# Patient Record
Sex: FEMALE | Race: WHITE | NOT HISPANIC OR LATINO | Employment: OTHER | URBAN - METROPOLITAN AREA
[De-identification: names, ages, dates, MRNs, and addresses within clinical notes are randomized per-mention and may not be internally consistent; named-entity substitution may affect disease eponyms.]

---

## 2023-05-19 ENCOUNTER — TELEPHONE (OUTPATIENT)
Dept: PRIMARY CARE | Facility: CLINIC | Age: 88
End: 2023-05-19
Payer: MEDICARE

## 2023-05-19 NOTE — TELEPHONE ENCOUNTER
Patient would like to get her lab work prior to her medicare wellness appointment in June. Please advise

## 2023-05-25 DIAGNOSIS — E55.9 VITAMIN D DEFICIENCY: ICD-10-CM

## 2023-05-25 DIAGNOSIS — E11.9 CONTROLLED TYPE 2 DIABETES MELLITUS WITHOUT COMPLICATION, WITHOUT LONG-TERM CURRENT USE OF INSULIN (MULTI): ICD-10-CM

## 2023-05-25 DIAGNOSIS — M81.0 OSTEOPOROSIS, UNSPECIFIED OSTEOPOROSIS TYPE, UNSPECIFIED PATHOLOGICAL FRACTURE PRESENCE: ICD-10-CM

## 2023-05-25 DIAGNOSIS — I48.92 ATRIAL FLUTTER, UNSPECIFIED TYPE (MULTI): ICD-10-CM

## 2023-05-25 DIAGNOSIS — E78.5 HYPERLIPIDEMIA, UNSPECIFIED HYPERLIPIDEMIA TYPE: ICD-10-CM

## 2023-05-25 DIAGNOSIS — I10 HYPERTENSION, UNSPECIFIED TYPE: ICD-10-CM

## 2023-06-15 ENCOUNTER — LAB (OUTPATIENT)
Dept: LAB | Facility: LAB | Age: 88
End: 2023-06-15
Payer: MEDICARE

## 2023-06-15 DIAGNOSIS — E55.9 VITAMIN D DEFICIENCY: ICD-10-CM

## 2023-06-15 DIAGNOSIS — E78.5 HYPERLIPIDEMIA, UNSPECIFIED HYPERLIPIDEMIA TYPE: ICD-10-CM

## 2023-06-15 DIAGNOSIS — I10 HYPERTENSION, UNSPECIFIED TYPE: ICD-10-CM

## 2023-06-15 DIAGNOSIS — E87.5 HYPERKALEMIA: ICD-10-CM

## 2023-06-15 DIAGNOSIS — E11.9 CONTROLLED TYPE 2 DIABETES MELLITUS WITHOUT COMPLICATION, WITHOUT LONG-TERM CURRENT USE OF INSULIN (MULTI): ICD-10-CM

## 2023-06-15 DIAGNOSIS — M81.0 OSTEOPOROSIS, UNSPECIFIED OSTEOPOROSIS TYPE, UNSPECIFIED PATHOLOGICAL FRACTURE PRESENCE: ICD-10-CM

## 2023-06-15 PROCEDURE — 84443 ASSAY THYROID STIM HORMONE: CPT

## 2023-06-15 PROCEDURE — 36415 COLL VENOUS BLD VENIPUNCTURE: CPT

## 2023-06-15 PROCEDURE — 82306 VITAMIN D 25 HYDROXY: CPT

## 2023-06-15 PROCEDURE — 83036 HEMOGLOBIN GLYCOSYLATED A1C: CPT

## 2023-06-15 PROCEDURE — 85025 COMPLETE CBC W/AUTO DIFF WBC: CPT

## 2023-06-15 PROCEDURE — 80061 LIPID PANEL: CPT

## 2023-06-15 PROCEDURE — 80053 COMPREHEN METABOLIC PANEL: CPT

## 2023-06-16 ENCOUNTER — APPOINTMENT (OUTPATIENT)
Dept: PRIMARY CARE | Facility: CLINIC | Age: 88
End: 2023-06-16
Payer: MEDICARE

## 2023-06-16 LAB
ALANINE AMINOTRANSFERASE (SGPT) (U/L) IN SER/PLAS: 9 U/L (ref 7–45)
ALBUMIN (G/DL) IN SER/PLAS: 4.2 G/DL (ref 3.4–5)
ALKALINE PHOSPHATASE (U/L) IN SER/PLAS: 47 U/L (ref 33–136)
ANION GAP IN SER/PLAS: 14 MMOL/L (ref 10–20)
ASPARTATE AMINOTRANSFERASE (SGOT) (U/L) IN SER/PLAS: 12 U/L (ref 9–39)
BASOPHILS (10*3/UL) IN BLOOD BY AUTOMATED COUNT: 0.05 X10E9/L (ref 0–0.1)
BASOPHILS/100 LEUKOCYTES IN BLOOD BY AUTOMATED COUNT: 0.5 % (ref 0–2)
BILIRUBIN TOTAL (MG/DL) IN SER/PLAS: 0.6 MG/DL (ref 0–1.2)
CALCIDIOL (25 OH VITAMIN D3) (NG/ML) IN SER/PLAS: 41 NG/ML
CALCIUM (MG/DL) IN SER/PLAS: 10.1 MG/DL (ref 8.6–10.6)
CARBON DIOXIDE, TOTAL (MMOL/L) IN SER/PLAS: 26 MMOL/L (ref 21–32)
CHLORIDE (MMOL/L) IN SER/PLAS: 102 MMOL/L (ref 98–107)
CHOLESTEROL (MG/DL) IN SER/PLAS: 120 MG/DL (ref 0–199)
CHOLESTEROL IN HDL (MG/DL) IN SER/PLAS: 43.4 MG/DL
CHOLESTEROL/HDL RATIO: 2.8
CREATININE (MG/DL) IN SER/PLAS: 1.05 MG/DL (ref 0.5–1.05)
EOSINOPHILS (10*3/UL) IN BLOOD BY AUTOMATED COUNT: 0.24 X10E9/L (ref 0–0.4)
EOSINOPHILS/100 LEUKOCYTES IN BLOOD BY AUTOMATED COUNT: 2.5 % (ref 0–6)
ERYTHROCYTE DISTRIBUTION WIDTH (RATIO) BY AUTOMATED COUNT: 15.8 % (ref 11.5–14.5)
ERYTHROCYTE MEAN CORPUSCULAR HEMOGLOBIN CONCENTRATION (G/DL) BY AUTOMATED: 29.8 G/DL (ref 32–36)
ERYTHROCYTE MEAN CORPUSCULAR VOLUME (FL) BY AUTOMATED COUNT: 96 FL (ref 80–100)
ERYTHROCYTES (10*6/UL) IN BLOOD BY AUTOMATED COUNT: 4.26 X10E12/L (ref 4–5.2)
ESTIMATED AVERAGE GLUCOSE FOR HBA1C: 137 MG/DL
GFR FEMALE: 52 ML/MIN/1.73M2
GLUCOSE (MG/DL) IN SER/PLAS: 112 MG/DL (ref 74–99)
HEMATOCRIT (%) IN BLOOD BY AUTOMATED COUNT: 40.9 % (ref 36–46)
HEMOGLOBIN (G/DL) IN BLOOD: 12.2 G/DL (ref 12–16)
HEMOGLOBIN A1C/HEMOGLOBIN TOTAL IN BLOOD: 6.4 %
IMMATURE GRANULOCYTES/100 LEUKOCYTES IN BLOOD BY AUTOMATED COUNT: 0.6 % (ref 0–0.9)
LDL: 47 MG/DL (ref 0–99)
LEUKOCYTES (10*3/UL) IN BLOOD BY AUTOMATED COUNT: 9.8 X10E9/L (ref 4.4–11.3)
LYMPHOCYTES (10*3/UL) IN BLOOD BY AUTOMATED COUNT: 1.69 X10E9/L (ref 0.8–3)
LYMPHOCYTES/100 LEUKOCYTES IN BLOOD BY AUTOMATED COUNT: 17.3 % (ref 13–44)
MONOCYTES (10*3/UL) IN BLOOD BY AUTOMATED COUNT: 0.65 X10E9/L (ref 0.05–0.8)
MONOCYTES/100 LEUKOCYTES IN BLOOD BY AUTOMATED COUNT: 6.7 % (ref 2–10)
NEUTROPHILS (10*3/UL) IN BLOOD BY AUTOMATED COUNT: 7.07 X10E9/L (ref 1.6–5.5)
NEUTROPHILS/100 LEUKOCYTES IN BLOOD BY AUTOMATED COUNT: 72.4 % (ref 40–80)
NRBC (PER 100 WBCS) BY AUTOMATED COUNT: 0 /100 WBC (ref 0–0)
PLATELETS (10*3/UL) IN BLOOD AUTOMATED COUNT: 271 X10E9/L (ref 150–450)
POTASSIUM (MMOL/L) IN SER/PLAS: 5.8 MMOL/L (ref 3.5–5.3)
PROTEIN TOTAL: 7 G/DL (ref 6.4–8.2)
SODIUM (MMOL/L) IN SER/PLAS: 136 MMOL/L (ref 136–145)
THYROTROPIN (MIU/L) IN SER/PLAS BY DETECTION LIMIT <= 0.05 MIU/L: 1.05 MIU/L (ref 0.44–3.98)
TRIGLYCERIDE (MG/DL) IN SER/PLAS: 147 MG/DL (ref 0–149)
UREA NITROGEN (MG/DL) IN SER/PLAS: 27 MG/DL (ref 6–23)
VLDL: 29 MG/DL (ref 0–40)

## 2023-06-17 ENCOUNTER — LAB (OUTPATIENT)
Dept: LAB | Facility: LAB | Age: 88
End: 2023-06-17
Payer: MEDICARE

## 2023-06-17 DIAGNOSIS — E87.5 HYPERKALEMIA: ICD-10-CM

## 2023-06-17 LAB
ALANINE AMINOTRANSFERASE (SGPT) (U/L) IN SER/PLAS: 7 U/L (ref 7–45)
ALBUMIN (G/DL) IN SER/PLAS: 4.4 G/DL (ref 3.4–5)
ALKALINE PHOSPHATASE (U/L) IN SER/PLAS: 47 U/L (ref 33–136)
ANION GAP IN SER/PLAS: 16 MMOL/L (ref 10–20)
ASPARTATE AMINOTRANSFERASE (SGOT) (U/L) IN SER/PLAS: 11 U/L (ref 9–39)
BILIRUBIN TOTAL (MG/DL) IN SER/PLAS: 0.5 MG/DL (ref 0–1.2)
CALCIUM (MG/DL) IN SER/PLAS: 10.3 MG/DL (ref 8.6–10.6)
CARBON DIOXIDE, TOTAL (MMOL/L) IN SER/PLAS: 26 MMOL/L (ref 21–32)
CHLORIDE (MMOL/L) IN SER/PLAS: 104 MMOL/L (ref 98–107)
CREATININE (MG/DL) IN SER/PLAS: 1.1 MG/DL (ref 0.5–1.05)
GFR FEMALE: 49 ML/MIN/1.73M2
GLUCOSE (MG/DL) IN SER/PLAS: 110 MG/DL (ref 74–99)
POTASSIUM (MMOL/L) IN SER/PLAS: 5.7 MMOL/L (ref 3.5–5.3)
PROTEIN TOTAL: 6.9 G/DL (ref 6.4–8.2)
SODIUM (MMOL/L) IN SER/PLAS: 140 MMOL/L (ref 136–145)
UREA NITROGEN (MG/DL) IN SER/PLAS: 25 MG/DL (ref 6–23)

## 2023-06-17 PROCEDURE — 36415 COLL VENOUS BLD VENIPUNCTURE: CPT

## 2023-06-17 PROCEDURE — 80053 COMPREHEN METABOLIC PANEL: CPT

## 2023-06-19 ENCOUNTER — OFFICE VISIT (OUTPATIENT)
Dept: PRIMARY CARE | Facility: CLINIC | Age: 88
End: 2023-06-19
Payer: MEDICARE

## 2023-06-19 VITALS
HEART RATE: 74 BPM | DIASTOLIC BLOOD PRESSURE: 62 MMHG | BODY MASS INDEX: 43.45 KG/M2 | SYSTOLIC BLOOD PRESSURE: 124 MMHG | WEIGHT: 207 LBS | HEIGHT: 58 IN

## 2023-06-19 DIAGNOSIS — Z78.0 ASYMPTOMATIC MENOPAUSAL STATE: ICD-10-CM

## 2023-06-19 DIAGNOSIS — Z00.00 ROUTINE GENERAL MEDICAL EXAMINATION AT HEALTH CARE FACILITY: Primary | ICD-10-CM

## 2023-06-19 DIAGNOSIS — Z13.89 ENCOUNTER FOR SCREENING FOR OTHER DISORDER: ICD-10-CM

## 2023-06-19 DIAGNOSIS — Z12.31 ENCOUNTER FOR SCREENING MAMMOGRAM FOR BREAST CANCER: ICD-10-CM

## 2023-06-19 DIAGNOSIS — E87.5 HYPERKALEMIA: ICD-10-CM

## 2023-06-19 DIAGNOSIS — I48.92 ATRIAL FLUTTER, UNSPECIFIED TYPE (MULTI): ICD-10-CM

## 2023-06-19 PROBLEM — R53.1 WEAKNESS: Status: ACTIVE | Noted: 2023-06-19

## 2023-06-19 PROBLEM — M54.50 LOW BACK PAIN: Status: ACTIVE | Noted: 2023-06-19

## 2023-06-19 PROBLEM — I50.30 DIASTOLIC HEART FAILURE (MULTI): Status: ACTIVE | Noted: 2023-06-19

## 2023-06-19 PROBLEM — M10.9 GOUT: Status: ACTIVE | Noted: 2023-06-19

## 2023-06-19 PROBLEM — R60.0 EDEMA OF BOTH LOWER EXTREMITIES: Status: ACTIVE | Noted: 2023-06-19

## 2023-06-19 PROBLEM — E11.40 DIABETIC NEUROPATHY (MULTI): Status: ACTIVE | Noted: 2023-06-19

## 2023-06-19 PROBLEM — M17.12 OSTEOARTHRITIS OF LEFT KNEE: Status: ACTIVE | Noted: 2023-06-19

## 2023-06-19 PROBLEM — I51.7 ENLARGED HEART: Status: ACTIVE | Noted: 2023-06-19

## 2023-06-19 PROBLEM — R80.9 PROTEINURIA: Status: ACTIVE | Noted: 2023-06-19

## 2023-06-19 PROBLEM — I12.9 HYPERTENSIVE KIDNEY DISEASE WITH CKD STAGE III (MULTI): Status: ACTIVE | Noted: 2020-08-21

## 2023-06-19 PROBLEM — R00.2 PALPITATIONS: Status: ACTIVE | Noted: 2023-06-19

## 2023-06-19 PROBLEM — N18.30 HYPERTENSIVE KIDNEY DISEASE WITH CKD STAGE III (MULTI): Status: ACTIVE | Noted: 2020-08-21

## 2023-06-19 PROBLEM — R26.89 BALANCE PROBLEM: Status: ACTIVE | Noted: 2023-06-19

## 2023-06-19 PROCEDURE — 3074F SYST BP LT 130 MM HG: CPT | Performed by: FAMILY MEDICINE

## 2023-06-19 PROCEDURE — 1160F RVW MEDS BY RX/DR IN RCRD: CPT | Performed by: FAMILY MEDICINE

## 2023-06-19 PROCEDURE — 99214 OFFICE O/P EST MOD 30 MIN: CPT | Performed by: FAMILY MEDICINE

## 2023-06-19 PROCEDURE — 3078F DIAST BP <80 MM HG: CPT | Performed by: FAMILY MEDICINE

## 2023-06-19 PROCEDURE — 1159F MED LIST DOCD IN RCRD: CPT | Performed by: FAMILY MEDICINE

## 2023-06-19 PROCEDURE — G0439 PPPS, SUBSEQ VISIT: HCPCS | Performed by: FAMILY MEDICINE

## 2023-06-19 PROCEDURE — 1036F TOBACCO NON-USER: CPT | Performed by: FAMILY MEDICINE

## 2023-06-19 PROCEDURE — 93000 ELECTROCARDIOGRAM COMPLETE: CPT | Performed by: FAMILY MEDICINE

## 2023-06-19 PROCEDURE — 1170F FXNL STATUS ASSESSED: CPT | Performed by: FAMILY MEDICINE

## 2023-06-19 PROCEDURE — G0444 DEPRESSION SCREEN ANNUAL: HCPCS | Performed by: FAMILY MEDICINE

## 2023-06-19 PROCEDURE — 99397 PER PM REEVAL EST PAT 65+ YR: CPT | Performed by: FAMILY MEDICINE

## 2023-06-19 RX ORDER — FUROSEMIDE 20 MG/1
1 TABLET ORAL
COMMUNITY
Start: 2022-11-21 | End: 2023-10-02 | Stop reason: SDUPTHER

## 2023-06-19 RX ORDER — ALLOPURINOL 300 MG/1
1 TABLET ORAL DAILY
COMMUNITY
Start: 2013-10-07 | End: 2024-02-05

## 2023-06-19 RX ORDER — ACETAMINOPHEN 325 MG/1
500 TABLET ORAL DAILY
COMMUNITY

## 2023-06-19 RX ORDER — APIXABAN 2.5 MG/1
1 TABLET, FILM COATED ORAL 2 TIMES DAILY
COMMUNITY
Start: 2015-12-02 | End: 2024-02-05

## 2023-06-19 RX ORDER — METFORMIN HYDROCHLORIDE 500 MG/1
2 TABLET ORAL 2 TIMES DAILY
COMMUNITY
Start: 2013-02-06 | End: 2023-10-27

## 2023-06-19 RX ORDER — LANOLIN ALCOHOL/MO/W.PET/CERES
1000 CREAM (GRAM) TOPICAL EVERY OTHER DAY
COMMUNITY

## 2023-06-19 RX ORDER — SIMVASTATIN 10 MG/1
1 TABLET, FILM COATED ORAL NIGHTLY
COMMUNITY
Start: 2013-10-13 | End: 2024-02-05

## 2023-06-19 RX ORDER — LISINOPRIL 5 MG/1
5 TABLET ORAL
COMMUNITY
Start: 2013-10-13 | End: 2023-07-07 | Stop reason: SINTOL

## 2023-06-19 RX ORDER — METOPROLOL TARTRATE 25 MG/1
25 TABLET, FILM COATED ORAL 2 TIMES DAILY
COMMUNITY
Start: 2020-12-14 | End: 2023-10-27

## 2023-06-19 RX ORDER — DILTIAZEM HYDROCHLORIDE 180 MG/1
180 CAPSULE, EXTENDED RELEASE ORAL 2 TIMES DAILY
COMMUNITY
Start: 2016-02-01 | End: 2023-11-29

## 2023-06-19 ASSESSMENT — ENCOUNTER SYMPTOMS
LIGHT-HEADEDNESS: 0
OCCASIONAL FEELINGS OF UNSTEADINESS: 1
HEADACHES: 0
DIZZINESS: 0
FEVER: 0
PALPITATIONS: 0
ACTIVITY CHANGE: 0
FACIAL ASYMMETRY: 0
DEPRESSION: 0
ARTHRALGIAS: 0
FATIGUE: 0
BACK PAIN: 0
COUGH: 0
SHORTNESS OF BREATH: 1
COLOR CHANGE: 0
CHEST TIGHTNESS: 0
LOSS OF SENSATION IN FEET: 1
CHOKING: 0
APPETITE CHANGE: 0

## 2023-06-19 ASSESSMENT — PATIENT HEALTH QUESTIONNAIRE - PHQ9
1. LITTLE INTEREST OR PLEASURE IN DOING THINGS: NOT AT ALL
SUM OF ALL RESPONSES TO PHQ9 QUESTIONS 1 AND 2: 0
2. FEELING DOWN, DEPRESSED OR HOPELESS: NOT AT ALL

## 2023-06-19 ASSESSMENT — ACTIVITIES OF DAILY LIVING (ADL)
DRESSING: INDEPENDENT
TAKING_MEDICATION: INDEPENDENT
DOING_HOUSEWORK: NEEDS ASSISTANCE
MANAGING_FINANCES: INDEPENDENT
GROCERY_SHOPPING: NEEDS ASSISTANCE
BATHING: INDEPENDENT

## 2023-06-19 NOTE — PROGRESS NOTES
Subjective   Reason for Visit: Jessie Bravo is an 88 y.o. female here for a Medicare Wellness visit.               HPI  Patient presents for physical exam no pap.      Fam Hx  Mom (86) , CAD, CHF, DMII  Dad (41)  new years day, PE vs ACS?     OB/GYN [facility]  Cardiologist, Dr. Morelos     Exercise walks  ETOH denies  Caffeine 1 cup coffee/day, hot tea once a week  Tobacco quit at 31, 15 years >1ppd     she has a place in [place], [facility]     Mammogram due   DEXA due  osteopenia  Colonoscopy refuses at this time     Patient denies other complaints.                       Patient Care Team:  Laura Zhang DO as PCP - General     Review of Systems   Constitutional:  Negative for activity change, appetite change, fatigue and fever.   HENT:  Negative for congestion.    Respiratory:  Positive for shortness of breath. Negative for cough, choking and chest tightness.    Cardiovascular:  Positive for leg swelling. Negative for chest pain and palpitations.   Musculoskeletal:  Negative for arthralgias, back pain and gait problem.   Skin:  Negative for color change and pallor.   Neurological:  Negative for dizziness, facial asymmetry, light-headedness and headaches.       Objective   Vitals:  There were no vitals taken for this visit.      Physical Exam  Constitutional:       General: She is not in acute distress.     Appearance: Normal appearance. She is not toxic-appearing.   HENT:      Head: Normocephalic.      Right Ear: Tympanic membrane, ear canal and external ear normal.      Left Ear: Tympanic membrane, ear canal and external ear normal.      Nose: Nose normal.      Mouth/Throat:      Pharynx: Oropharynx is clear.   Eyes:      Conjunctiva/sclera: Conjunctivae normal.      Pupils: Pupils are equal, round, and reactive to light.   Cardiovascular:      Rate and Rhythm: Normal rate. Rhythm irregular.      Pulses: Normal pulses.      Heart sounds: Murmur heard.   Pulmonary:      Effort: No  respiratory distress.      Breath sounds: No wheezing, rhonchi or rales.   Abdominal:      General: Bowel sounds are normal. There is no distension.      Palpations: Abdomen is soft.      Tenderness: There is no abdominal tenderness.   Musculoskeletal:         General: No swelling or tenderness.      Cervical back: No tenderness.      Right lower leg: Edema present.      Left lower leg: Edema present.      Comments: +1 pitting edema bilateral lower extremities   Skin:     Findings: No lesion or rash.   Neurological:      General: No focal deficit present.      Mental Status: She is alert and oriented to person, place, and time. Mental status is at baseline.      Gait: Gait normal.   Psychiatric:         Mood and Affect: Mood normal.         Behavior: Behavior normal.         Thought Content: Thought content normal.         Judgment: Judgment normal.         Assessment/Plan   Problem List Items Addressed This Visit    None  1. Patient's blood work discussed at this office visit  2. Patient's LDL goal <70, current LDL 47  3. Patient's TG goal <150, current , start on TYPE IV diet  4. HgbA1c goal <6.5, current 6.4 continue diet and exercise  5. Vitamin d level is back to normal continue on vitamin D replacement daily  6. Patient's potassium level is still elevated, we will discuss at this office visit  7. Mammogram due 2023  8. DEXA due 2023 osteopenia  9. Colonoscopy refuses at this time, IFOB  10. Patient to call if questions or concerns

## 2023-06-22 ENCOUNTER — LAB (OUTPATIENT)
Dept: LAB | Facility: LAB | Age: 88
End: 2023-06-22
Payer: MEDICARE

## 2023-06-22 DIAGNOSIS — E87.5 HYPERKALEMIA: ICD-10-CM

## 2023-06-22 PROCEDURE — 80053 COMPREHEN METABOLIC PANEL: CPT

## 2023-06-22 PROCEDURE — 36415 COLL VENOUS BLD VENIPUNCTURE: CPT

## 2023-06-23 LAB
ALANINE AMINOTRANSFERASE (SGPT) (U/L) IN SER/PLAS: 7 U/L (ref 7–45)
ALBUMIN (G/DL) IN SER/PLAS: 4.5 G/DL (ref 3.4–5)
ALKALINE PHOSPHATASE (U/L) IN SER/PLAS: 54 U/L (ref 33–136)
ANION GAP IN SER/PLAS: 18 MMOL/L (ref 10–20)
ASPARTATE AMINOTRANSFERASE (SGOT) (U/L) IN SER/PLAS: 12 U/L (ref 9–39)
BILIRUBIN TOTAL (MG/DL) IN SER/PLAS: 0.4 MG/DL (ref 0–1.2)
CALCIUM (MG/DL) IN SER/PLAS: 9.8 MG/DL (ref 8.6–10.6)
CARBON DIOXIDE, TOTAL (MMOL/L) IN SER/PLAS: 21 MMOL/L (ref 21–32)
CHLORIDE (MMOL/L) IN SER/PLAS: 102 MMOL/L (ref 98–107)
CREATININE (MG/DL) IN SER/PLAS: 1.06 MG/DL (ref 0.5–1.05)
GFR FEMALE: 51 ML/MIN/1.73M2
GLUCOSE (MG/DL) IN SER/PLAS: 109 MG/DL (ref 74–99)
POTASSIUM (MMOL/L) IN SER/PLAS: 5.6 MMOL/L (ref 3.5–5.3)
PROTEIN TOTAL: 7.2 G/DL (ref 6.4–8.2)
SODIUM (MMOL/L) IN SER/PLAS: 135 MMOL/L (ref 136–145)
UREA NITROGEN (MG/DL) IN SER/PLAS: 25 MG/DL (ref 6–23)

## 2023-06-28 ENCOUNTER — TELEPHONE (OUTPATIENT)
Dept: PRIMARY CARE | Facility: CLINIC | Age: 88
End: 2023-06-28
Payer: MEDICARE

## 2023-06-28 NOTE — TELEPHONE ENCOUNTER
Marcelina with [facility] left a voicemail stating patient was discharged yesterday and was seeing if you would follow for home care services, skilled nursing, PT, OT.     [phone]

## 2023-06-29 ENCOUNTER — PATIENT OUTREACH (OUTPATIENT)
Dept: CARE COORDINATION | Facility: CLINIC | Age: 88
End: 2023-06-29
Payer: MEDICARE

## 2023-06-29 DIAGNOSIS — E87.5 HYPERKALEMIA: ICD-10-CM

## 2023-06-29 RX ORDER — CEFDINIR 300 MG/1
300 CAPSULE ORAL 2 TIMES DAILY
Qty: 4 CAPSULE | Refills: 0 | COMMUNITY
Start: 2023-06-27 | End: 2023-06-29

## 2023-06-29 NOTE — PROGRESS NOTES
Discharge Facility:[facility]  Discharge Diagnosis:E87.5 Hyperkalemia  Admission Date:6/24/23  Discharge Date: 6/27/23    PCP Appointment Date:7/7/23  Specialist Appointment Date:   Hospital Encounter and Summary: Linked   See discharge assessment below for further details    Engagement  Call Start Time: 1000 (6/29/2023 10:00 AM)    Medications  Medications reviewed with patient/caregiver?: Yes (6/29/2023 10:00 AM)  Is the patient having any side effects they believe may be caused by any medication additions or changes?: No (6/29/2023 10:00 AM)  Does the patient have all medications ordered at discharge?: Yes (6/29/2023 10:00 AM)  Care Management Interventions: Provided patient education (6/29/2023 10:00 AM)  Is the patient taking all medications as directed (includes completed medication regime)?: Yes (6/29/2023 10:00 AM)  Care Management Interventions: Provided patient education (6/29/2023 10:00 AM)  Medication Comments: see med list (6/29/2023 10:00 AM)    Appointments  Does the patient have a primary care provider?: Yes (6/29/2023 10:00 AM)  Care Management Interventions: Verified appointment date/time/provider (6/29/2023 10:00 AM)  Has the patient kept scheduled appointments due by today?: Yes (6/29/2023 10:00 AM)  Care Management Interventions: Advised patient to keep appointment; Educated on importance of keeping appointment (6/29/2023 10:00 AM)    Self Management  What is the home health agency?: unsure of name (6/29/2023 10:00 AM)  Has home health visited the patient within 72 hours of discharge?: Call prior to 72 hours (6/29/2023 10:00 AM)    Patient Teaching  Does the patient have access to their discharge instructions?: Yes (6/29/2023 10:00 AM)  Care Management Interventions: Reviewed instructions with patient (6/29/2023 10:00 AM)  What is the patient's perception of their health status since discharge?: Improving (6/29/2023 10:00 AM)  Is the patient/caregiver able to teach back the hierarchy of who to  call/visit for symptoms/problems? PCP, Specialist, Home Health nurse, Urgent Care, ED, 911: Yes (6/29/2023 10:00 AM)

## 2023-07-05 ENCOUNTER — TELEPHONE (OUTPATIENT)
Dept: PRIMARY CARE | Facility: CLINIC | Age: 88
End: 2023-07-05
Payer: MEDICARE

## 2023-07-05 NOTE — TELEPHONE ENCOUNTER
[facility] left a voicemail wanted to give you an update, they have reached out to the patient for PT and OT. Did leave a voicemail for the patient.

## 2023-07-07 ENCOUNTER — OFFICE VISIT (OUTPATIENT)
Dept: PRIMARY CARE | Facility: CLINIC | Age: 88
End: 2023-07-07
Payer: MEDICARE

## 2023-07-07 VITALS
HEART RATE: 54 BPM | DIASTOLIC BLOOD PRESSURE: 70 MMHG | OXYGEN SATURATION: 93 % | RESPIRATION RATE: 16 BRPM | WEIGHT: 199.4 LBS | TEMPERATURE: 97.1 F | SYSTOLIC BLOOD PRESSURE: 124 MMHG | HEIGHT: 58 IN | BODY MASS INDEX: 41.86 KG/M2

## 2023-07-07 DIAGNOSIS — R09.02 HYPOXIA: ICD-10-CM

## 2023-07-07 DIAGNOSIS — R31.9 URINARY TRACT INFECTION WITH HEMATURIA, SITE UNSPECIFIED: ICD-10-CM

## 2023-07-07 DIAGNOSIS — N39.0 URINARY TRACT INFECTION WITH HEMATURIA, SITE UNSPECIFIED: ICD-10-CM

## 2023-07-07 DIAGNOSIS — E87.5 HYPERKALEMIA: Primary | ICD-10-CM

## 2023-07-07 PROCEDURE — 3078F DIAST BP <80 MM HG: CPT | Performed by: FAMILY MEDICINE

## 2023-07-07 PROCEDURE — 99495 TRANSJ CARE MGMT MOD F2F 14D: CPT | Performed by: FAMILY MEDICINE

## 2023-07-07 PROCEDURE — 1036F TOBACCO NON-USER: CPT | Performed by: FAMILY MEDICINE

## 2023-07-07 PROCEDURE — 1159F MED LIST DOCD IN RCRD: CPT | Performed by: FAMILY MEDICINE

## 2023-07-07 PROCEDURE — 1160F RVW MEDS BY RX/DR IN RCRD: CPT | Performed by: FAMILY MEDICINE

## 2023-07-07 PROCEDURE — 3074F SYST BP LT 130 MM HG: CPT | Performed by: FAMILY MEDICINE

## 2023-07-07 ASSESSMENT — ENCOUNTER SYMPTOMS
LOSS OF SENSATION IN FEET: 0
OCCASIONAL FEELINGS OF UNSTEADINESS: 0
DEPRESSION: 0

## 2023-07-07 NOTE — PROGRESS NOTES
"Patient: Jessie Bravo  : 1935  PCP: Laura Zhang DO  MRN: [id number]  Program: No linked episodes     Jessie Bravo is a 88 y.o. female presenting today for follow-up after being discharged from the hospital 10 days ago. The main problem requiring admission was a hyperkalemia, hypoxia, UTI. The discharge summary and/or Transitional Care Management documentation was reviewed. Medication reconciliation was performed as indicated via the \"Chaz as Reviewed\" timestamp.     Jessie Bravo was contacted by Transitional Care Management services two days after her discharge. This encounter and supporting documentation was reviewed.    The complexity of medical decision making for this patient's transitional care is moderate.    DISCHARGE SUMMARY    PATIENT NAME: Jessie Bravo Code Status: Full Code  MRN: [id number]    Highest Readmission Risk Score: 19  The 30 day readmissions risk score is derived from an internally validated risk model which evaluates patient level characteristics, utilization history, medication orders and lab results up until the day of discharge. Patients with a score of 40 or above are considered highest risk for readmission. Specific patient level drivers will be listed at the bottom of the summary.    Admission Information    Admission Information  ADMIT DATE: 2023  DISCHARGE DATE: 2023    MY DOCTORS AND MEDICAL TEAM:  My Main Hospital Doctor: Benjamín Stephens MD  Primary Care Provider: Laura Zhang DO  My Medical Team Members: Treatment Team:  Attending Provider: Benjamín Stephens MD  Primary Service: AK SOUND BLUE  Consulting: Risa Chester MD  Consulting: Yousif Harding MD    MY CONDITION AT DISCHARGE: Stable    REASON I WAS IN THE HOSPITAL: Hyperkalemia , hypoxia , UTI    SUMMARY OF WHAT HAPPENED WHILE I WAS IN THE HOSPITAL:  Patient presented to ED due to hyperkalemia on outpatient labs.  On admission she was found to be hypoxic required 2L NC likely due to her pulmonary " fibrosis and concern of CHF , Echocardiogram completed with normal heart function , seen by Cardiology and no further work up is recommended , Afib remained controlled on BB and patient on liquids .  Patient received lasix on admission , lisinopril held ,  her K back to normal , respiratory status improved and currently stable in room air,  Cr at baseline , her BP has been controlled off lisinopril , follow up with PCP in 1 week to repeat BMP and resume lisinopril if appropriate.  Seen by nephrology .  Noted to have positive UA with mild leukocytosis, started on antibiotics and to completed the course as outpatient.  Patient is medically stable for discharge , home with [facility].    OTHER PROBLEMS/DIAGNOSIS:  Principal Problem:  Hyperkalemia  Active Problems:  Pulmonary fibrosis (HCC)  Essential hypertension  Hypertensive kidney disease with CKD stage III (HCC)  Acute on chronic systolic CHF (congestive heart failure) (HCC)  Dyspnea on exertion  Localized edema  MICHAEL (acute kidney injury) (HCC)  Lower extremity edema  Renal insufficiency  Resolved Problems:  * No resolved hospital problems. *    OPERATIONS PERFORMED WHILE IN THE HOSPITAL: None    IMPORTANT TEST/PROCEDURES:  Echocardiogram    TEST RESULTS NOT AVAILABLE AT THIS TIME:  No pending results    Discharge Disposition  Discharge Disposition: Home With Home Care        Activity When You Leave the Hospital    Resume pre-hospital activity        Diet Instructions    Resume your pre-hospital diet        Follow Up Appointments    Follow-Up Appointment  When: In 1 week  Laura Zhang  [phone]   Physician Services  [address]  [address]  [facility] [address]    PCP Requested Referral        Additional Provider to Provider Information:    Treatment Team:  Attending Provider: Benjamín Stephens MD  Primary Service: AK SOUND BLUE  Consulting: Risa Chester MD  Consulting: Yousif Harding MD  Transitions of Care Critical Issues:  As DC summary    LABS AND PROCEDURES  "PENDING AT DISCHARGE: No pending results.        FOLLOW-UP APPOINTMENTS ALREADY SCHEDULED WITH A [facility] PROVIDER:  No future appointments.    ALLERGIES  No Known Allergies    DISCHARGE MEDICATION: Current Discharge Medication List    START taking these medications    cefdinir (OMNICEF) 300 mg  Take 300 mg by mouth twice daily.    Qty: 4 capsule Refills: 0    CONTINUE these medications which have NOT CHANGED    apixaban (ELIQUIS) 2.5 mg  Take 2.5 mg by mouth twice daily.    Qty: 180 tablet Refills: 3    VITAMIN B-12 1,000 mcg  Take 1,000 mcg by mouth every other day.    allopurinol (ZYLOPRIM) 300 mg  Take 300 mg by mouth once daily.    CARTIA  mg  Take 180 mg by mouth twice daily.    metFORMIN (GLUCOPHAGE) 1,000 mg  Take 1,000 mg by mouth twice daily with meals.    simvastatin (ZOCOR) 10 mg  Take 10 mg by mouth daily at bedtime.    VITAMIN E,DL-ALPHA TOCOPHEROL, (VITAMIN E, BULK, MISC) 1 capsule  Take 1 capsule by mouth two times a week.    furosemide (LASIX) 20 mg tablet  TAKE 1 TABLET BY MOUTH EVERY MONDAY, WEDNESDAY AND FRIDAY  Qty: 90 tablet Refills: 3    metoprolol tartrate (short acting) (LOPRESSOR) 25 mg  Take 25 mg by mouth twice daily.    Qty: 180 tablet Refills: 3    CALCIUM CARBONATE/VITAMIN D3 (VITAMIN D-3 ORAL) 2,000 Units  Take 2,000 Units by mouth once daily.    acetaminophen (TYLENOL) 650 mg  Take 650 mg by mouth every 6 hours as needed.    ASCORBIC ACID (VITAMIN C ORAL) 1 tablet  Take 1 tablet by mouth once daily.        Discharge Physical Exam:  VITAL SIGNS: /66  Pulse 107  Temp 36.4 °C (97.5 °F) (Oral)  Resp 20  Ht 149.9 cm (4' 11\")  Wt 90 kg (198 lb 6.6 oz)  SpO2 92%  BMI 40.07 kg/m²  GENERAL: Alert, no distress, cooperative  LUNGS: Lungs clear to auscultation, Good diaphragmatic excursion  CARDIAC: Normal S1 and S2; no rubs, murmurs, or gallops  ABDOMEN: Abdomen soft, non-tender, BS normal, No masses or organomegaly  EXTREMITIES: Extremities normal, no deformities, " edema, clubbing or skin discoloration. Good capillary refill., No ulcers    Review of Systems    Family History   Problem Relation Name Age of Onset    Diabetes type II Mother      Heart disease Mother      Heart attack Father      Breast cancer Sister      Other (bladder cancer) Sister      Thyroid cancer Sister      Other (MURDERED) Sister         Engagement  Call Start Time: 1000 (6/29/2023 10:00 AM)    Medications  Medications reviewed with patient/caregiver?: Yes (6/29/2023 10:00 AM)  Is the patient having any side effects they believe may be caused by any medication additions or changes?: No (6/29/2023 10:00 AM)  Does the patient have all medications ordered at discharge?: Yes (6/29/2023 10:00 AM)  Care Management Interventions: Provided patient education (6/29/2023 10:00 AM)  Is the patient taking all medications as directed (includes completed medication regime)?: Yes (6/29/2023 10:00 AM)  Care Management Interventions: Provided patient education (6/29/2023 10:00 AM)  Medication Comments: see med list (6/29/2023 10:00 AM)    Appointments  Does the patient have a primary care provider?: Yes (6/29/2023 10:00 AM)  Care Management Interventions: Verified appointment date/time/provider (6/29/2023 10:00 AM)  Has the patient kept scheduled appointments due by today?: Yes (6/29/2023 10:00 AM)  Care Management Interventions: Advised patient to keep appointment; Educated on importance of keeping appointment (6/29/2023 10:00 AM)    Self Management  What is the home health agency?: unsure of name (6/29/2023 10:00 AM)  Has home health visited the patient within 72 hours of discharge?: Call prior to 72 hours (6/29/2023 10:00 AM)    Patient Teaching  Does the patient have access to their discharge instructions?: Yes (6/29/2023 10:00 AM)  Care Management Interventions: Reviewed instructions with patient (6/29/2023 10:00 AM)  What is the patient's perception of their health status since discharge?: Improving (6/29/2023 10:00  AM)  Is the patient/caregiver able to teach back the hierarchy of who to call/visit for symptoms/problems? PCP, Specialist, Home Health nurse, Urgent Care, ED, 911: Yes (6/29/2023 10:00 AM)        No follow-ups on file.    Objective   There were no vitals taken for this visit.  BSA There is no height or weight on file to calculate BSA.    Physical Exam  Lab on 06/22/2023   Component Date Value Ref Range Status    Glucose 06/22/2023 109 (H)  74 - 99 mg/dL Final    Sodium 06/22/2023 135 (L)  136 - 145 mmol/L Final    Potassium 06/22/2023 5.6 (H)  3.5 - 5.3 mmol/L Final    Chloride 06/22/2023 102  98 - 107 mmol/L Final    Bicarbonate 06/22/2023 21  21 - 32 mmol/L Final    Anion Gap 06/22/2023 18  10 - 20 mmol/L Final    Urea Nitrogen 06/22/2023 25 (H)  6 - 23 mg/dL Final    Creatinine 06/22/2023 1.06 (H)  0.50 - 1.05 mg/dL Final    GFR Female 06/22/2023 51 (A)  >90 mL/min/1.73m2 Final     CALCULATIONS OF ESTIMATED GFR ARE PERFORMED   USING THE 2021 CKD-EPI STUDY REFIT EQUATION   WITHOUT THE RACE VARIABLE FOR THE IDMS-TRACEABLE   CREATININE METHODS.    https://jasn.asnjournals.org/content/early/2021/09/22/ASN.7086930784    Calcium 06/22/2023 9.8  8.6 - 10.6 mg/dL Final    Albumin 06/22/2023 4.5  3.4 - 5.0 g/dL Final    Alkaline Phosphatase 06/22/2023 54  33 - 136 U/L Final    Total Protein 06/22/2023 7.2  6.4 - 8.2 g/dL Final    AST 06/22/2023 12  9 - 39 U/L Final    Total Bilirubin 06/22/2023 0.4  0.0 - 1.2 mg/dL Final    ALT (SGPT) 06/22/2023 7  7 - 45 U/L Final     Patients treated with Sulfasalazine may generate    falsely decreased results for ALT.   Lab on 06/17/2023   Component Date Value Ref Range Status    Glucose 06/17/2023 110 (H)  74 - 99 mg/dL Final    Sodium 06/17/2023 140  136 - 145 mmol/L Final    Potassium 06/17/2023 5.7 (H)  3.5 - 5.3 mmol/L Final    Chloride 06/17/2023 104  98 - 107 mmol/L Final    Bicarbonate 06/17/2023 26  21 - 32 mmol/L Final    Anion Gap 06/17/2023 16  10 - 20 mmol/L Final    Urea  Nitrogen 06/17/2023 25 (H)  6 - 23 mg/dL Final    Creatinine 06/17/2023 1.10 (H)  0.50 - 1.05 mg/dL Final    GFR Female 06/17/2023 49 (A)  >90 mL/min/1.73m2 Final     CALCULATIONS OF ESTIMATED GFR ARE PERFORMED   USING THE 2021 CKD-EPI STUDY REFIT EQUATION   WITHOUT THE RACE VARIABLE FOR THE IDMS-TRACEABLE   CREATININE METHODS.    https://jasn.asnjournals.org/content/early/2021/09/22/ASN.7125286858    Calcium 06/17/2023 10.3  8.6 - 10.6 mg/dL Final    Albumin 06/17/2023 4.4  3.4 - 5.0 g/dL Final    Alkaline Phosphatase 06/17/2023 47  33 - 136 U/L Final    Total Protein 06/17/2023 6.9  6.4 - 8.2 g/dL Final    AST 06/17/2023 11  9 - 39 U/L Final    Total Bilirubin 06/17/2023 0.5  0.0 - 1.2 mg/dL Final    ALT (SGPT) 06/17/2023 7  7 - 45 U/L Final     Patients treated with Sulfasalazine may generate    falsely decreased results for ALT.   Lab on 06/15/2023   Component Date Value Ref Range Status    WBC 06/15/2023 9.8  4.4 - 11.3 x10E9/L Final    nRBC 06/15/2023 0.0  0.0 - 0.0 /100 WBC Final    RBC 06/15/2023 4.26  4.00 - 5.20 x10E12/L Final    Hemoglobin 06/15/2023 12.2  12.0 - 16.0 g/dL Final    Hematocrit 06/15/2023 40.9  36.0 - 46.0 % Final    MCV 06/15/2023 96  80 - 100 fL Final    MCHC 06/15/2023 29.8 (L)  32.0 - 36.0 g/dL Final    Platelets 06/15/2023 271  150 - 450 x10E9/L Final    RDW 06/15/2023 15.8 (H)  11.5 - 14.5 % Final    Neutrophils % 06/15/2023 72.4  40.0 - 80.0 % Final    Immature Granulocytes %, Automated 06/15/2023 0.6  0.0 - 0.9 % Final     Immature Granulocyte Count (IG) includes promyelocytes,    myelocytes and metamyelocytes but does not include bands.   Percent differential counts (%) should be interpreted in the   context of the absolute cell counts (cells/L).    Lymphocytes % 06/15/2023 17.3  13.0 - 44.0 % Final    Monocytes % 06/15/2023 6.7  2.0 - 10.0 % Final    Eosinophils % 06/15/2023 2.5  0.0 - 6.0 % Final    Basophils % 06/15/2023 0.5  0.0 - 2.0 % Final    Neutrophils Absolute 06/15/2023  7.07 (H)  1.60 - 5.50 x10E9/L Final    Lymphocytes Absolute 06/15/2023 1.69  0.80 - 3.00 x10E9/L Final    Monocytes Absolute 06/15/2023 0.65  0.05 - 0.80 x10E9/L Final    Eosinophils Absolute 06/15/2023 0.24  0.00 - 0.40 x10E9/L Final    Basophils Absolute 06/15/2023 0.05  0.00 - 0.10 x10E9/L Final    Glucose 06/15/2023 112 (H)  74 - 99 mg/dL Final    Sodium 06/15/2023 136  136 - 145 mmol/L Final    Potassium 06/15/2023 5.8 (H)  3.5 - 5.3 mmol/L Final    Chloride 06/15/2023 102  98 - 107 mmol/L Final    Bicarbonate 06/15/2023 26  21 - 32 mmol/L Final    Anion Gap 06/15/2023 14  10 - 20 mmol/L Final    Urea Nitrogen 06/15/2023 27 (H)  6 - 23 mg/dL Final    Creatinine 06/15/2023 1.05  0.50 - 1.05 mg/dL Final    GFR Female 06/15/2023 52 (A)  >90 mL/min/1.73m2 Final     CALCULATIONS OF ESTIMATED GFR ARE PERFORMED   USING THE 2021 CKD-EPI STUDY REFIT EQUATION   WITHOUT THE RACE VARIABLE FOR THE IDMS-TRACEABLE   CREATININE METHODS.    https://jasn.asnjournals.org/content/early/2021/09/22/ASN.0055243366    Calcium 06/15/2023 10.1  8.6 - 10.6 mg/dL Final    Albumin 06/15/2023 4.2  3.4 - 5.0 g/dL Final    Alkaline Phosphatase 06/15/2023 47  33 - 136 U/L Final    Total Protein 06/15/2023 7.0  6.4 - 8.2 g/dL Final    AST 06/15/2023 12  9 - 39 U/L Final    Total Bilirubin 06/15/2023 0.6  0.0 - 1.2 mg/dL Final    ALT (SGPT) 06/15/2023 9  7 - 45 U/L Final     Patients treated with Sulfasalazine may generate    falsely decreased results for ALT.    Cholesterol 06/15/2023 120  0 - 199 mg/dL Final    .      AGE      DESIRABLE   BORDERLINE HIGH   HIGH     0-19 Y     0 - 169       170 - 199     >/= 200    20-24 Y     0 - 189       190 - 224     >/= 225         >24 Y     0 - 199       200 - 239     >/= 240   **All ranges are based on fasting samples. Specific   therapeutic targets will vary based on patient-specific   cardiac risk.  .   Pediatric guidelines reference:Pediatrics 2011, 128(S5).   Adult guidelines reference: NCEP ATPIII  Guidelines,     MAYLIN 2001, 258:2486-97  .   Venipuncture immediately after or during the    administration of Metamizole may lead to falsely   low results. Testing should be performed immediately   prior to Metamizole dosing.    HDL 06/15/2023 43.4  mg/dL Final    .      AGE      VERY LOW   LOW     NORMAL    HIGH       0-19 Y       < 35   < 40     40-45     ----    20-24 Y       ----   < 40       >45     ----      >24 Y       ----   < 40     40-60      >60  .    Cholesterol/HDL Ratio 06/15/2023 2.8   Final    REF VALUES  DESIRABLE  < 3.4  HIGH RISK  > 5.0    LDL 06/15/2023 47  0 - 99 mg/dL Final    .                           NEAR      BORD      AGE      DESIRABLE  OPTIMAL    HIGH     HIGH     VERY HIGH     0-19 Y     0 - 109     ---    110-129   >/= 130     ----    20-24 Y     0 - 119     ---    120-159   >/= 160     ----      >24 Y     0 -  99   100-129  130-159   160-189     >/=190  .    VLDL 06/15/2023 29  0 - 40 mg/dL Final    Triglycerides 06/15/2023 147  0 - 149 mg/dL Final    .      AGE      DESIRABLE   BORDERLINE HIGH   HIGH     VERY HIGH   0 D-90 D    19 - 174         ----         ----        ----  91 D- 9 Y     0 -  74        75 -  99     >/= 100      ----    10-19 Y     0 -  89        90 - 129     >/= 130      ----    20-24 Y     0 - 114       115 - 149     >/= 150      ----         >24 Y     0 - 149       150 - 199    200- 499    >/= 500  .   Venipuncture immediately after or during the    administration of Metamizole may lead to falsely   low results. Testing should be performed immediately   prior to Metamizole dosing.    TSH 06/15/2023 1.05  0.44 - 3.98 mIU/L Final     TSH testing is performed using different testing    methodology at [facility] than at other    [facility]. Direct result comparisons should    only be made within the same method.    Vitamin D, 25-Hydroxy 06/15/2023 41  ng/mL Final    .  DEFICIENCY:         < 20   NG/ML  INSUFFICIENCY:      20-29   NG/ML  SUFFICIENCY:         NG/ML    THIS ASSAY ACCURATELY QUANTIFIES THE SUM OF  VITAMIN D3, 25-HYDROXY AND VIT D2,25-HYDROXY.    Hemoglobin A1C 06/15/2023 6.4 (A)  % Final         Diagnosis of Diabetes-Adults   Non-Diabetic: < or = 5.6%   Increased risk for developing diabetes: 5.7-6.4%   Diagnostic of diabetes: > or = 6.5%  .       Monitoring of Diabetes                Age (y)     Therapeutic Goal (%)   Adults:          >18           <7.0   Pediatrics:    13-18           <7.5                   7-12           <8.0                   0- 6            7.5-8.5   American Diabetes Association. Diabetes Care 33(S1), Jan 2010.    Estimated Average Glucose 06/15/2023 137  MG/DL Final   [facility] Encounter on 10/25/2022   Component Date Value Ref Range Status    BNP 10/25/2022 38  0 - 99 pg/mL Final    Comment: .  <100 pg/mL - Heart failure unlikely  100-299 pg/mL - Intermediate probability of acute heart  .               failure exacerbation. Correlate with clinical  .               context and patient history.    >=300 pg/mL - Heart Failure likely. Correlate with clinical  .               context and patient history.   Biotin interference may cause falsely decreased results.   Patients taking a Biotin dose of up to 5 mg/day should   refrain from taking Biotin for 24 hours before sample   collection. Providers may contact their local laboratory   for further information.   14:03 10/25/2022.    Called- RB to GILBERTO LENZ; REQUESTED FAX -[phone] , 10/25/2022   14:03       Current Outpatient Medications on File Prior to Visit   Medication Sig Dispense Refill    acetaminophen (Tylenol) 325 mg tablet Take 500 mg by mouth once daily. PRN      allopurinol (Zyloprim) 300 mg tablet Take 1 tablet (300 mg) by mouth once daily.      Cartia  mg 24 hr capsule Take 1 capsule (180 mg) by mouth twice a day.      cyanocobalamin (Vitamin B-12) 1,000 mcg tablet Take 1 tablet (1,000 mcg) by mouth every other day.      Eliquis  2.5 mg tablet Take 1 tablet (2.5 mg) by mouth 2 times a day.      furosemide (Lasix) 20 mg tablet Take 1 tablet (20 mg) by mouth once a day on Monday, Wednesday, and Friday.      lisinopril 5 mg tablet Take 1 tablet (5 mg) by mouth once daily.      metFORMIN (Glucophage) 500 mg tablet Take 2 tablets (1,000 mg) by mouth 2 times a day.      metoprolol tartrate (Lopressor) 25 mg tablet Take 1 tablet (25 mg) by mouth twice a day.      simvastatin (Zocor) 10 mg tablet Take 1 tablet (10 mg) by mouth once daily at bedtime.       No current facility-administered medications on file prior to visit.     No images are attached to the encounter.            Assessment/Plan   Diagnoses and all orders for this visit:  Hyperkalemia  Hypoxia  Urinary tract infection with hematuria, site unspecified  Patient to stay off her lisinopril  Patient to recheck cmp for her high potassium  Patient to recheck her urine for follow up the UTI  Patient to call if questions or concerns

## 2023-07-10 ENCOUNTER — CLINICAL SUPPORT (OUTPATIENT)
Dept: PRIMARY CARE | Facility: CLINIC | Age: 88
End: 2023-07-10
Payer: MEDICARE

## 2023-07-10 DIAGNOSIS — R82.81 PYURIA: Primary | ICD-10-CM

## 2023-07-10 LAB
POC BILIRUBIN, URINE: NEGATIVE
POC BLOOD, URINE: NEGATIVE
POC GLUCOSE, URINE: NEGATIVE MG/DL
POC KETONES, URINE: NEGATIVE MG/DL
POC LEUKOCYTES, URINE: ABNORMAL
POC NITRITE,URINE: NEGATIVE
POC PH, URINE: 5 PH
POC PROTEIN, URINE: NEGATIVE MG/DL
POC SPECIFIC GRAVITY, URINE: 1.01
POC UROBILINOGEN, URINE: 0.2 EU/DL

## 2023-07-10 PROCEDURE — 81003 URINALYSIS AUTO W/O SCOPE: CPT | Performed by: FAMILY MEDICINE

## 2023-07-10 PROCEDURE — 87086 URINE CULTURE/COLONY COUNT: CPT

## 2023-07-10 NOTE — PROGRESS NOTES
Patient dropped off urine recheck after finishing antibiotic. Dr Zhang looked at sample and is negative. Urine culture sent. Notified patient.

## 2023-07-11 LAB — URINE CULTURE: NORMAL

## 2023-07-13 ENCOUNTER — TELEPHONE (OUTPATIENT)
Dept: PRIMARY CARE | Facility: CLINIC | Age: 88
End: 2023-07-13
Payer: MEDICARE

## 2023-07-13 NOTE — TELEPHONE ENCOUNTER
Nurse Robertson with [facility] home care left a voicemail stating Jessie was scheduled for a home care visit today. Having trouble finding someone to be at the house with her for her visit so they canceled appointment. They do not want seen until next week.     [phone]

## 2023-07-18 ENCOUNTER — TELEPHONE (OUTPATIENT)
Dept: PRIMARY CARE | Facility: CLINIC | Age: 88
End: 2023-07-18
Payer: MEDICARE

## 2023-07-18 NOTE — TELEPHONE ENCOUNTER
Ailyn calling from [facility] home care to advise that the pt's Oxygen level has dropped to 85 with ambulation.  Within a few minutes it went back to between 90-92.  While sitting it is has been  around 90.    Ailyn states they were going to discharge her from skilled nursing today but she is now going to add more visits.    Ailyn also advised that pt's daughter, Angelica, would like someone to call her at [phone].      Ailyn # [phone]

## 2023-07-19 ENCOUNTER — TELEPHONE (OUTPATIENT)
Dept: PRIMARY CARE | Facility: CLINIC | Age: 88
End: 2023-07-19
Payer: MEDICARE

## 2023-07-19 NOTE — TELEPHONE ENCOUNTER
Angelica from Occupational therapy with [facility] left a voicemail stating she tried to see her yesterday for occupational therapy evaluation. After talking with her daughter they decided she was not in need of any need of OT services at this time. They have all the safety basis covered and working well with Physical therapy.         [phone]

## 2023-07-20 ENCOUNTER — TELEPHONE (OUTPATIENT)
Dept: PRIMARY CARE | Facility: CLINIC | Age: 88
End: 2023-07-20

## 2023-07-20 ENCOUNTER — LAB (OUTPATIENT)
Dept: LAB | Facility: LAB | Age: 88
End: 2023-07-20
Payer: MEDICARE

## 2023-07-20 DIAGNOSIS — E87.5 HYPERKALEMIA: ICD-10-CM

## 2023-07-21 ENCOUNTER — PATIENT OUTREACH (OUTPATIENT)
Dept: CARE COORDINATION | Facility: CLINIC | Age: 88
End: 2023-07-21
Payer: MEDICARE

## 2023-07-21 NOTE — PROGRESS NOTES
Unable to reach patient for call back after patient's follow up appointment with PCP.   MEETAM with call back number for patient to call if needed   If no voicemail available call attempts x 2 were made to contact the patient to assist with any questions or concerns patient may have.

## 2023-07-21 NOTE — TELEPHONE ENCOUNTER
Ailyn from [facility] calling she was out to see patient again yesterday; again her resting pulse ox was between 90-92 but when she was up moving it dropped to 85-86; pt is refusing to go to the ER as advised; Ailyn states she is going to the get her labs done today, she is wondering if maybe you would want to order a chest xray.    Please advise pt daughter Angelica [phone]

## 2023-07-22 ENCOUNTER — TELEPHONE (OUTPATIENT)
Dept: PRIMARY CARE | Facility: CLINIC | Age: 88
End: 2023-07-22
Payer: MEDICARE

## 2023-07-22 NOTE — TELEPHONE ENCOUNTER
Spoke with pt, she will try to find a ride to come in today  She is to call the office and let us know if she can or not

## 2023-07-24 ENCOUNTER — LAB (OUTPATIENT)
Dept: LAB | Facility: LAB | Age: 88
End: 2023-07-24
Payer: MEDICARE

## 2023-07-24 ENCOUNTER — TELEPHONE (OUTPATIENT)
Dept: PRIMARY CARE | Facility: CLINIC | Age: 88
End: 2023-07-24
Payer: MEDICARE

## 2023-07-24 DIAGNOSIS — E87.5 HYPERKALEMIA: ICD-10-CM

## 2023-07-24 PROCEDURE — 80053 COMPREHEN METABOLIC PANEL: CPT

## 2023-07-24 PROCEDURE — 36415 COLL VENOUS BLD VENIPUNCTURE: CPT

## 2023-07-24 NOTE — TELEPHONE ENCOUNTER
There is another message regarding this pt. I spoke w/ the dtr who said pt is just getting her blood work done now. Pt is doing just fine. Dtr will let us know if pt needs an appt; she really would prefer to come in over going to the ER. Her O2 going down w/ movement has been happening for a few wks, but it isn't any worse. She was recently seen in the ER for it & the visiting nurses are seeing pt on a regular basis & will keep us posted.

## 2023-07-25 LAB
ALANINE AMINOTRANSFERASE (SGPT) (U/L) IN SER/PLAS: 6 U/L (ref 7–45)
ALBUMIN (G/DL) IN SER/PLAS: 3.9 G/DL (ref 3.4–5)
ALKALINE PHOSPHATASE (U/L) IN SER/PLAS: 54 U/L (ref 33–136)
ANION GAP IN SER/PLAS: 19 MMOL/L (ref 10–20)
ASPARTATE AMINOTRANSFERASE (SGOT) (U/L) IN SER/PLAS: 13 U/L (ref 9–39)
BILIRUBIN TOTAL (MG/DL) IN SER/PLAS: 0.4 MG/DL (ref 0–1.2)
CALCIUM (MG/DL) IN SER/PLAS: 9.5 MG/DL (ref 8.6–10.6)
CARBON DIOXIDE, TOTAL (MMOL/L) IN SER/PLAS: 23 MMOL/L (ref 21–32)
CHLORIDE (MMOL/L) IN SER/PLAS: 101 MMOL/L (ref 98–107)
CREATININE (MG/DL) IN SER/PLAS: 0.93 MG/DL (ref 0.5–1.05)
GFR FEMALE: 60 ML/MIN/1.73M2
GLUCOSE (MG/DL) IN SER/PLAS: 94 MG/DL (ref 74–99)
POTASSIUM (MMOL/L) IN SER/PLAS: 4.5 MMOL/L (ref 3.5–5.3)
PROTEIN TOTAL: 6.9 G/DL (ref 6.4–8.2)
SODIUM (MMOL/L) IN SER/PLAS: 138 MMOL/L (ref 136–145)
UREA NITROGEN (MG/DL) IN SER/PLAS: 17 MG/DL (ref 6–23)

## 2023-07-27 ENCOUNTER — TELEPHONE (OUTPATIENT)
Dept: PRIMARY CARE | Facility: CLINIC | Age: 88
End: 2023-07-27
Payer: MEDICARE

## 2023-07-27 NOTE — TELEPHONE ENCOUNTER
Verona from  Home Care called because pt is experiencing symptoms of a UTI and they wanted to get permission to order it?

## 2023-08-07 ENCOUNTER — TELEPHONE (OUTPATIENT)
Dept: PRIMARY CARE | Facility: CLINIC | Age: 88
End: 2023-08-07
Payer: MEDICARE

## 2023-08-08 NOTE — TELEPHONE ENCOUNTER
Notified patients daughter urine culture is contaminated, not clean catch. Will drop off urine sample tomorrow to send for culture.

## 2023-08-09 ENCOUNTER — CLINICAL SUPPORT (OUTPATIENT)
Dept: PRIMARY CARE | Facility: CLINIC | Age: 88
End: 2023-08-09
Payer: MEDICARE

## 2023-08-09 DIAGNOSIS — R82.81 PYURIA: ICD-10-CM

## 2023-08-09 PROCEDURE — 87086 URINE CULTURE/COLONY COUNT: CPT

## 2023-08-11 LAB — URINE CULTURE: NORMAL

## 2023-08-22 ENCOUNTER — PATIENT OUTREACH (OUTPATIENT)
Dept: CARE COORDINATION | Facility: CLINIC | Age: 88
End: 2023-08-22
Payer: MEDICARE

## 2023-08-30 ENCOUNTER — OFFICE VISIT (OUTPATIENT)
Dept: PRIMARY CARE | Facility: CLINIC | Age: 88
End: 2023-08-30
Payer: MEDICARE

## 2023-08-30 VITALS
BODY MASS INDEX: 40.57 KG/M2 | TEMPERATURE: 97.2 F | DIASTOLIC BLOOD PRESSURE: 70 MMHG | WEIGHT: 195.8 LBS | SYSTOLIC BLOOD PRESSURE: 118 MMHG | HEART RATE: 59 BPM

## 2023-08-30 DIAGNOSIS — L03.032 PARONYCHIA OF GREAT TOE, LEFT: Primary | ICD-10-CM

## 2023-08-30 DIAGNOSIS — M25.552 LEFT HIP PAIN: ICD-10-CM

## 2023-08-30 PROCEDURE — 1159F MED LIST DOCD IN RCRD: CPT | Performed by: FAMILY MEDICINE

## 2023-08-30 PROCEDURE — 1160F RVW MEDS BY RX/DR IN RCRD: CPT | Performed by: FAMILY MEDICINE

## 2023-08-30 PROCEDURE — 3078F DIAST BP <80 MM HG: CPT | Performed by: FAMILY MEDICINE

## 2023-08-30 PROCEDURE — 99214 OFFICE O/P EST MOD 30 MIN: CPT | Performed by: FAMILY MEDICINE

## 2023-08-30 PROCEDURE — 1036F TOBACCO NON-USER: CPT | Performed by: FAMILY MEDICINE

## 2023-08-30 PROCEDURE — 3074F SYST BP LT 130 MM HG: CPT | Performed by: FAMILY MEDICINE

## 2023-08-30 RX ORDER — CEPHALEXIN 500 MG/1
500 CAPSULE ORAL 2 TIMES DAILY
Qty: 20 CAPSULE | Refills: 0 | Status: SHIPPED | OUTPATIENT
Start: 2023-08-30 | End: 2023-09-09

## 2023-08-30 ASSESSMENT — ENCOUNTER SYMPTOMS
WOUND: 1
PALPITATIONS: 0
ACTIVITY CHANGE: 0
COLOR CHANGE: 0
DIZZINESS: 0
HEADACHES: 0
ARTHRALGIAS: 0
CHOKING: 0
FACIAL ASYMMETRY: 0
CHEST TIGHTNESS: 0
LIGHT-HEADEDNESS: 0
FEVER: 0
FATIGUE: 0
COUGH: 0
APPETITE CHANGE: 0
BACK PAIN: 0

## 2023-08-30 NOTE — PROGRESS NOTES
Subjective   Patient ID: Jessie Bravo is a 88 y.o. female who presents for Left great toe cut too short. .    HPI   Patient comes in stating that she recently was at her podiatrist and she thinks that potentially her toenail was cut a little too short and she is concerned that she is going to get another infection she reports it is red and swollen.  Review of Systems   Constitutional:  Negative for activity change, appetite change, fatigue and fever.   HENT:  Negative for congestion.    Respiratory:  Negative for cough, choking and chest tightness.    Cardiovascular:  Negative for chest pain, palpitations and leg swelling.   Musculoskeletal:  Negative for arthralgias, back pain and gait problem.   Skin:  Positive for wound. Negative for color change and pallor.   Neurological:  Negative for dizziness, facial asymmetry, light-headedness and headaches.       Objective   /70 (BP Location: Right arm)   Pulse 59   Temp 36.2 °C (97.2 °F)   Wt 88.8 kg (195 lb 12.8 oz)   BMI 40.57 kg/m²   BSA Body surface area is 1.91 meters squared.      Physical Exam  Constitutional:       Appearance: Normal appearance.   Skin:     General: Skin is warm and dry.      Findings: Lesion present. No rash.      Comments: Left great toe paronychia   Neurological:      Mental Status: She is alert.         Current Outpatient Medications on File Prior to Visit   Medication Sig Dispense Refill    acetaminophen (Tylenol) 325 mg tablet Take 500 mg by mouth once daily. PRN      allopurinol (Zyloprim) 300 mg tablet Take 1 tablet (300 mg) by mouth once daily.      Cartia  mg 24 hr capsule Take 1 capsule (180 mg) by mouth twice a day.      cyanocobalamin (Vitamin B-12) 1,000 mcg tablet Take 1 tablet (1,000 mcg) by mouth every other day.      Eliquis 2.5 mg tablet Take 1 tablet (2.5 mg) by mouth 2 times a day.      furosemide (Lasix) 20 mg tablet Take 1 tablet (20 mg) by mouth once a day on Monday, Wednesday, and Friday.      metFORMIN  (Glucophage) 500 mg tablet Take 2 tablets (1,000 mg) by mouth 2 times a day.      metoprolol tartrate (Lopressor) 25 mg tablet Take 1 tablet (25 mg) by mouth twice a day.      simvastatin (Zocor) 10 mg tablet Take 1 tablet (10 mg) by mouth once daily at bedtime.       No current facility-administered medications on file prior to visit.     No images are attached to the encounter.            Assessment/Plan   Diagnoses and all orders for this visit:  Paronychia of great toe, left    1.  Patient to start on antibiotics  2.  Patient to call for questions or concerns

## 2023-09-06 ENCOUNTER — OFFICE VISIT (OUTPATIENT)
Dept: PRIMARY CARE | Facility: CLINIC | Age: 88
End: 2023-09-06
Payer: MEDICARE

## 2023-09-06 VITALS
BODY MASS INDEX: 40.82 KG/M2 | OXYGEN SATURATION: 84 % | WEIGHT: 197 LBS | DIASTOLIC BLOOD PRESSURE: 62 MMHG | HEART RATE: 54 BPM | SYSTOLIC BLOOD PRESSURE: 124 MMHG

## 2023-09-06 DIAGNOSIS — S39.012A STRAIN OF LUMBAR REGION, INITIAL ENCOUNTER: ICD-10-CM

## 2023-09-06 DIAGNOSIS — J96.01 ACUTE RESPIRATORY FAILURE WITH HYPOXIA (MULTI): Primary | ICD-10-CM

## 2023-09-06 DIAGNOSIS — L03.032 PARONYCHIA OF GREAT TOE, LEFT: ICD-10-CM

## 2023-09-06 PROBLEM — R60.0 LOCALIZED EDEMA: Status: ACTIVE | Noted: 2023-06-25

## 2023-09-06 PROBLEM — I50.23 ACUTE ON CHRONIC SYSTOLIC CHF (CONGESTIVE HEART FAILURE) (MULTI): Status: ACTIVE | Noted: 2023-06-25

## 2023-09-06 PROBLEM — R60.0 LOWER EXTREMITY EDEMA: Status: ACTIVE | Noted: 2023-06-26

## 2023-09-06 PROBLEM — E87.5 HYPERKALEMIA: Status: ACTIVE | Noted: 2023-06-24

## 2023-09-06 PROBLEM — N17.9 AKI (ACUTE KIDNEY INJURY) (CMS-HCC): Status: ACTIVE | Noted: 2023-06-26

## 2023-09-06 PROBLEM — R06.09 DYSPNEA ON EXERTION: Status: ACTIVE | Noted: 2023-06-25

## 2023-09-06 PROBLEM — N28.9 RENAL INSUFFICIENCY: Status: ACTIVE | Noted: 2023-06-26

## 2023-09-06 PROCEDURE — 99214 OFFICE O/P EST MOD 30 MIN: CPT | Performed by: FAMILY MEDICINE

## 2023-09-06 PROCEDURE — 1159F MED LIST DOCD IN RCRD: CPT | Performed by: FAMILY MEDICINE

## 2023-09-06 PROCEDURE — 3074F SYST BP LT 130 MM HG: CPT | Performed by: FAMILY MEDICINE

## 2023-09-06 PROCEDURE — 3078F DIAST BP <80 MM HG: CPT | Performed by: FAMILY MEDICINE

## 2023-09-06 PROCEDURE — 1036F TOBACCO NON-USER: CPT | Performed by: FAMILY MEDICINE

## 2023-09-06 PROCEDURE — 1160F RVW MEDS BY RX/DR IN RCRD: CPT | Performed by: FAMILY MEDICINE

## 2023-09-06 ASSESSMENT — ENCOUNTER SYMPTOMS
ARTHRALGIAS: 0
FATIGUE: 0
CHOKING: 0
FEVER: 0
PALPITATIONS: 0
COLOR CHANGE: 0
APPETITE CHANGE: 0
DIZZINESS: 0
WOUND: 1
SHORTNESS OF BREATH: 1
BACK PAIN: 0
FACIAL ASYMMETRY: 0
HEADACHES: 0
LIGHT-HEADEDNESS: 0
CHEST TIGHTNESS: 0
ACTIVITY CHANGE: 0
COUGH: 0

## 2023-09-06 NOTE — PROGRESS NOTES
Subjective   Patient ID: Jessie Bravo is a 88 y.o. female who presents for Follow-up.    HPI   Patient comes in stating that she recently was at her podiatrist and she thinks that potentially her toenail was cut a little too short and she is concerned that she is going to get another infection she reports it is red and swollen.    She pulled a muscle in her back 4-5 weeks ago and is here to discuss.     Patient explains that she has been having pulse ox 79% to 88%.  Has been taking her Lasix daily without much relief in the swelling of her legs.  Review of Systems   Constitutional:  Negative for activity change, appetite change, fatigue and fever.   HENT:  Negative for congestion.    Respiratory:  Positive for shortness of breath. Negative for cough, choking and chest tightness.    Cardiovascular:  Negative for chest pain, palpitations and leg swelling.   Musculoskeletal:  Negative for arthralgias, back pain and gait problem.   Skin:  Positive for wound. Negative for color change and pallor.   Neurological:  Negative for dizziness, facial asymmetry, light-headedness and headaches.       Objective   /62 (BP Location: Left arm)   Pulse 54   Wt 89.4 kg (197 lb)   SpO2 (!) 84%   BMI 40.82 kg/m²   BSA Body surface area is 1.92 meters squared.      Physical Exam  Constitutional:       Appearance: Normal appearance.   HENT:      Right Ear: Tympanic membrane, ear canal and external ear normal.      Left Ear: Tympanic membrane, ear canal and external ear normal.   Eyes:      Extraocular Movements: Extraocular movements intact.      Pupils: Pupils are equal, round, and reactive to light.   Pulmonary:      Effort: Respiratory distress present.      Breath sounds: No wheezing or rales.      Comments: Patient is able to talk in disjointed sentences somewhat using accessory muscles to breathe  Musculoskeletal:      Right lower leg: Edema present.      Left lower leg: Edema present.      Comments: +1 pitting edema  bilateral lower extremity edema   Skin:     General: Skin is warm and dry.      Findings: Lesion present. No rash.      Comments: Left great toe paronychia   Neurological:      Mental Status: She is alert.         Current Outpatient Medications on File Prior to Visit   Medication Sig Dispense Refill    acetaminophen (Tylenol) 325 mg tablet Take 500 mg by mouth once daily. PRN      allopurinol (Zyloprim) 300 mg tablet Take 1 tablet (300 mg) by mouth once daily.      Cartia  mg 24 hr capsule Take 1 capsule (180 mg) by mouth twice a day.      cephalexin (Keflex) 500 mg capsule Take 1 capsule (500 mg) by mouth 2 times a day for 10 days. 20 capsule 0    cyanocobalamin (Vitamin B-12) 1,000 mcg tablet Take 1 tablet (1,000 mcg) by mouth every other day.      Eliquis 2.5 mg tablet Take 1 tablet (2.5 mg) by mouth 2 times a day.      furosemide (Lasix) 20 mg tablet Take 1 tablet (20 mg) by mouth once a day on Monday, Wednesday, and Friday.      metFORMIN (Glucophage) 500 mg tablet Take 2 tablets (1,000 mg) by mouth 2 times a day.      metoprolol tartrate (Lopressor) 25 mg tablet Take 1 tablet (25 mg) by mouth twice a day.      simvastatin (Zocor) 10 mg tablet Take 1 tablet (10 mg) by mouth once daily at bedtime.       No current facility-administered medications on file prior to visit.     No images are attached to the encounter.            Assessment/Plan   Diagnoses and all orders for this visit:  Acute respiratory failure with hypoxia (CMS/HCC)  Strain of lumbar region, initial encounter  Paronychia of great toe, left    1.  Patient to go to [facility] emergency room called to talk to  In ER and they are aware of patient's coming to emergency room  2.  Patient to call for questions or concerns

## 2023-09-07 ENCOUNTER — TELEPHONE (OUTPATIENT)
Dept: PRIMARY CARE | Facility: CLINIC | Age: 88
End: 2023-09-07
Payer: MEDICARE

## 2023-09-07 NOTE — TELEPHONE ENCOUNTER
Notified patients daughter x-ray of Lumbar spine performed downstairs showed significant arthritis. Daughter states she did not get a hip x-ray.       PINKY

## 2023-09-08 ENCOUNTER — TELEPHONE (OUTPATIENT)
Dept: PRIMARY CARE | Facility: CLINIC | Age: 88
End: 2023-09-08
Payer: MEDICARE

## 2023-09-08 NOTE — TELEPHONE ENCOUNTER
Amy with [facility] left a voicemail wanting to see if you would follow patient for nursing and PT once she is discharged from the hospital ?

## 2023-09-18 ENCOUNTER — TELEPHONE (OUTPATIENT)
Dept: PRIMARY CARE | Facility: CLINIC | Age: 88
End: 2023-09-18
Payer: MEDICARE

## 2023-09-18 NOTE — TELEPHONE ENCOUNTER
Jessie called about her mom, she was in on the September 6th and oxygen levels were not good. Patient was in hospital day, had congestive heart failure. Legs hurting really bad, after days could not move, the pain was too much.    Currently at nursing home, hospital sent over her medication list but not everything was listed.    Basically she wants to know if you want her on Lisinopril because that is not listed on her current medication list. If yes a new list needs to be sent to her nursing home.    Daughter Jessie - [phone]

## 2023-09-19 NOTE — TELEPHONE ENCOUNTER
Discussed with daughter Angelica. SNF has been giving this to her because it was on the discharge summary. Her BP's have been pretty low as well. The facility would need an order to discontinue the medication. Order written and signed by PCP and faxed to [facility]. I spoke to the patient's nurse there and she is aware this is being sent over.

## 2023-09-27 ENCOUNTER — PATIENT OUTREACH (OUTPATIENT)
Dept: CARE COORDINATION | Facility: CLINIC | Age: 88
End: 2023-09-27
Payer: MEDICARE

## 2023-09-27 NOTE — PROGRESS NOTES
Call placed regarding 90 days post discharge follow up call.  At time of outreach call the patient feels as if their condition has improved since initial visit with PCP or specialist. No questions or concerns.

## 2023-10-02 DIAGNOSIS — R60.0 EDEMA OF BOTH LOWER EXTREMITIES: ICD-10-CM

## 2023-10-02 RX ORDER — FUROSEMIDE 20 MG/1
20 TABLET ORAL DAILY
Qty: 30 TABLET | Refills: 0 | Status: SHIPPED | OUTPATIENT
Start: 2023-10-02 | End: 2023-11-07

## 2023-10-04 ENCOUNTER — OFFICE VISIT (OUTPATIENT)
Dept: PRIMARY CARE | Facility: CLINIC | Age: 88
End: 2023-10-04
Payer: MEDICARE

## 2023-10-04 ENCOUNTER — LAB (OUTPATIENT)
Dept: LAB | Facility: LAB | Age: 88
End: 2023-10-04
Payer: MEDICARE

## 2023-10-04 VITALS
HEART RATE: 66 BPM | WEIGHT: 190.2 LBS | DIASTOLIC BLOOD PRESSURE: 64 MMHG | BODY MASS INDEX: 39.41 KG/M2 | OXYGEN SATURATION: 95 % | SYSTOLIC BLOOD PRESSURE: 118 MMHG

## 2023-10-04 DIAGNOSIS — R25.2 LEG CRAMPS: ICD-10-CM

## 2023-10-04 DIAGNOSIS — R09.02 HYPOXIA: Primary | ICD-10-CM

## 2023-10-04 DIAGNOSIS — J96.01 ACUTE RESPIRATORY FAILURE WITH HYPOXIA (MULTI): ICD-10-CM

## 2023-10-04 DIAGNOSIS — M17.10 ARTHRITIS OF KNEE: ICD-10-CM

## 2023-10-04 DIAGNOSIS — J84.10 PULMONARY FIBROSIS (MULTI): ICD-10-CM

## 2023-10-04 DIAGNOSIS — I50.9 CONGESTIVE HEART FAILURE, UNSPECIFIED HF CHRONICITY, UNSPECIFIED HEART FAILURE TYPE (MULTI): ICD-10-CM

## 2023-10-04 PROBLEM — G47.00 INSOMNIA, UNSPECIFIED: Status: ACTIVE | Noted: 2023-09-12

## 2023-10-04 PROBLEM — I13.0 HYPERTENSIVE HEART AND CHRONIC KIDNEY DISEASE WITH HEART FAILURE AND STAGE 1 THROUGH STAGE 4 CHRONIC KIDNEY DISEASE, OR UNSPECIFIED CHRONIC KIDNEY DISEASE (MULTI): Status: ACTIVE | Noted: 2023-09-12

## 2023-10-04 PROBLEM — I48.20 CHRONIC ATRIAL FIBRILLATION, UNSPECIFIED (MULTI): Status: ACTIVE | Noted: 2023-09-12

## 2023-10-04 PROBLEM — M81.0 AGE-RELATED OSTEOPOROSIS WITHOUT CURRENT PATHOLOGICAL FRACTURE: Status: ACTIVE | Noted: 2023-09-12

## 2023-10-04 PROBLEM — R26.2 DIFFICULTY IN WALKING, NOT ELSEWHERE CLASSIFIED: Status: ACTIVE | Noted: 2023-09-13

## 2023-10-04 PROBLEM — M62.81 MUSCLE WEAKNESS (GENERALIZED): Status: ACTIVE | Noted: 2023-09-13

## 2023-10-04 PROBLEM — R29.3 ABNORMAL POSTURE: Status: ACTIVE | Noted: 2023-09-13

## 2023-10-04 PROBLEM — R26.89 OTHER ABNORMALITIES OF GAIT AND MOBILITY: Status: ACTIVE | Noted: 2023-09-13

## 2023-10-04 PROBLEM — R42 DIZZINESS AND GIDDINESS: Status: ACTIVE | Noted: 2023-09-13

## 2023-10-04 PROCEDURE — 83735 ASSAY OF MAGNESIUM: CPT

## 2023-10-04 PROCEDURE — 1036F TOBACCO NON-USER: CPT | Performed by: FAMILY MEDICINE

## 2023-10-04 PROCEDURE — 99215 OFFICE O/P EST HI 40 MIN: CPT | Performed by: FAMILY MEDICINE

## 2023-10-04 PROCEDURE — 3078F DIAST BP <80 MM HG: CPT | Performed by: FAMILY MEDICINE

## 2023-10-04 PROCEDURE — 1160F RVW MEDS BY RX/DR IN RCRD: CPT | Performed by: FAMILY MEDICINE

## 2023-10-04 PROCEDURE — 36415 COLL VENOUS BLD VENIPUNCTURE: CPT

## 2023-10-04 PROCEDURE — 84100 ASSAY OF PHOSPHORUS: CPT

## 2023-10-04 PROCEDURE — 80053 COMPREHEN METABOLIC PANEL: CPT

## 2023-10-04 PROCEDURE — 1159F MED LIST DOCD IN RCRD: CPT | Performed by: FAMILY MEDICINE

## 2023-10-04 PROCEDURE — 3074F SYST BP LT 130 MM HG: CPT | Performed by: FAMILY MEDICINE

## 2023-10-04 RX ORDER — MICONAZOLE NITRATE 2 %
1 POWDER (GRAM) TOPICAL 2 TIMES DAILY
COMMUNITY
Start: 2023-09-12

## 2023-10-04 RX ORDER — DICLOFENAC SODIUM 10 MG/G
4 GEL TOPICAL 4 TIMES DAILY
Qty: 100 G | Refills: 1 | Status: SHIPPED | OUTPATIENT
Start: 2023-10-04

## 2023-10-04 ASSESSMENT — ENCOUNTER SYMPTOMS
SHORTNESS OF BREATH: 1
ACTIVITY CHANGE: 0
PALPITATIONS: 0
CHOKING: 0
DIZZINESS: 0
LIGHT-HEADEDNESS: 0
CHEST TIGHTNESS: 0
COLOR CHANGE: 0
FATIGUE: 0
BACK PAIN: 0
FEVER: 0
FACIAL ASYMMETRY: 0
ARTHRALGIAS: 0
HEADACHES: 0
COUGH: 0
APPETITE CHANGE: 0

## 2023-10-04 NOTE — PROGRESS NOTES
Subjective   Patient ID: Jessie Bravo is a 88 y.o. female who presents for [facility] Follow up. 9-6 (Discharged to [facility] for rehab. ).    [facility]   Admission Information  ADMIT DATE: 9/6/2023  DISCHARGE DATE: 9/12/2023    MY DOCTORS AND MEDICAL TEAM:  My Main Hospital Doctor: Diane Smith DO  Primary Care Provider: Laura Zhang DO  My Medical Team Members: Treatment Team:  Attending Provider: Diane Smith DO  Attending: Suhail Zurita MD  Primary Service: [employer]OUND YELLOW    MY CONDITION AT DISCHARGE: Stable    REASON I WAS IN THE HOSPITAL: A/C diastolic CHF    SUMMARY OF WHAT HAPPENED WHILE I WAS IN THE HOSPITAL:    89 yo F presented with Shortness of breath, hypoxia    Patient has a past medical history of chronic diastolic heart failure, hypertension, hyperlipidemia, diabetes, persistent atrial fibrillation/flutter, on Eliquis 2.5 mg twice a day secondary to history of falls and history of pulmonary fibrosis    Echo 6/26/23  - The left ventricle is normal in size. There is no left ventricular hypertrophy.  Left ventricular systolic function is normal. EF = 58 ± 5% (2D biplane) Left  ventricular diastolic function was not evaluated due to AF.  - The right ventricle is mildly dilated. Right ventricular systolic function is  normal.  - No significant valve disease.    9/6/23 CXR Small bilateral pleural effusions    Treated for A/C diastolic CHF    She received IV lasix  On lasix 20 mg oral daily    # Safety Deficits, Impaired Self Care, Decreased Activity Tolerance, Decreased Range Of Motion, Decreased Strength, Functional Mobility Impairment, Balance Impaired    Recommended Discharge Disposition: Subacute/SNF    Discharge Disposition  Discharge Disposition: Skilled Nursing Facility - Less than 30 Days    Review of Systems   Constitutional:  Negative for activity change, appetite change, fatigue and fever.   HENT:  Negative for congestion.    Respiratory:  Positive for shortness of  breath. Negative for cough, choking and chest tightness.    Cardiovascular:  Positive for leg swelling. Negative for chest pain and palpitations.   Musculoskeletal:  Negative for arthralgias, back pain and gait problem.   Skin:  Negative for color change and pallor.   Neurological:  Negative for dizziness, facial asymmetry, light-headedness and headaches.       Objective   /64   Pulse 66   Wt 86.3 kg (190 lb 3.2 oz)   SpO2 95%   BMI 39.41 kg/m²   BSA Body surface area is 1.88 meters squared.      Physical Exam  Constitutional:       General: She is not in acute distress.     Appearance: Normal appearance. She is not toxic-appearing.   HENT:      Head: Normocephalic.      Right Ear: Tympanic membrane, ear canal and external ear normal.      Left Ear: Tympanic membrane, ear canal and external ear normal.   Eyes:      Conjunctiva/sclera: Conjunctivae normal.      Pupils: Pupils are equal, round, and reactive to light.   Cardiovascular:      Rate and Rhythm: Normal rate and regular rhythm.      Pulses: Normal pulses.      Heart sounds: No murmur heard.     Comments: Irregularly irregular    Pulmonary:      Effort: No respiratory distress.      Breath sounds: No wheezing, rhonchi or rales.   Musculoskeletal:         General: No swelling or tenderness.   Skin:     Findings: No lesion or rash.   Neurological:      General: No focal deficit present.      Mental Status: She is alert and oriented to person, place, and time. Mental status is at baseline.      Gait: Gait normal.   Psychiatric:         Mood and Affect: Mood normal.         Behavior: Behavior normal.         Thought Content: Thought content normal.         Judgment: Judgment normal.         Current Outpatient Medications on File Prior to Visit   Medication Sig Dispense Refill    acetaminophen (Tylenol) 325 mg tablet Take 500 mg by mouth once daily. PRN      allopurinol (Zyloprim) 300 mg tablet Take 1 tablet (300 mg) by mouth once daily.      Cartia XT  180 mg 24 hr capsule Take 1 capsule (180 mg) by mouth twice a day.      cyanocobalamin (Vitamin B-12) 1,000 mcg tablet Take 1 tablet (1,000 mcg) by mouth every other day.      Eliquis 2.5 mg tablet Take 1 tablet (2.5 mg) by mouth 2 times a day.      furosemide (Lasix) 20 mg tablet Take 1 tablet (20 mg) by mouth once daily. 30 tablet 0    metFORMIN (Glucophage) 500 mg tablet Take 2 tablets (1,000 mg) by mouth 2 times a day.      metoprolol tartrate (Lopressor) 25 mg tablet Take 1 tablet (25 mg) by mouth twice a day.      miconazole (Micotin) 2 % powder Apply 1 Application topically twice a day.      simvastatin (Zocor) 10 mg tablet Take 1 tablet (10 mg) by mouth once daily at bedtime.      sodium chloride (Ocean) 0.65 % nasal spray 2 sprays by Does not apply route every 6 hours if needed.      [DISCONTINUED] furosemide (Lasix) 20 mg tablet Take 1 tablet (20 mg) by mouth once a day on Monday, Wednesday, and Friday.       No current facility-administered medications on file prior to visit.     No images are attached to the encounter.            Assessment/Plan   Diagnoses and all orders for this visit:  Hypoxia  Congestive heart failure, unspecified HF chronicity, unspecified heart failure type (CMS/HCC)  Arthritis of knee  -     diclofenac sodium (Voltaren) 1 % gel gel; Apply 1 Application topically 4 times a day.  Leg cramps  -     Magnesium; Future  -     Phosphorus; Future  -     Comprehensive metabolic panel; Future    1.  Patient to continue on current medication  2.  Patient to continue on her oxygen  3.  Patient to call for questions or concerns

## 2023-10-05 LAB
ALBUMIN SERPL BCP-MCNC: 4 G/DL (ref 3.4–5)
ALP SERPL-CCNC: 51 U/L (ref 33–136)
ALT SERPL W P-5'-P-CCNC: 8 U/L (ref 7–45)
ANION GAP SERPL CALC-SCNC: 17 MMOL/L (ref 10–20)
AST SERPL W P-5'-P-CCNC: 12 U/L (ref 9–39)
BILIRUB SERPL-MCNC: 0.5 MG/DL (ref 0–1.2)
BUN SERPL-MCNC: 21 MG/DL (ref 6–23)
CALCIUM SERPL-MCNC: 9.4 MG/DL (ref 8.6–10.6)
CHLORIDE SERPL-SCNC: 102 MMOL/L (ref 98–107)
CO2 SERPL-SCNC: 27 MMOL/L (ref 21–32)
CREAT SERPL-MCNC: 0.96 MG/DL (ref 0.5–1.05)
GFR SERPL CREATININE-BSD FRML MDRD: 58 ML/MIN/1.73M*2
GLUCOSE SERPL-MCNC: 101 MG/DL (ref 74–99)
MAGNESIUM SERPL-MCNC: 1.23 MG/DL (ref 1.6–2.4)
PHOSPHATE SERPL-MCNC: 3.5 MG/DL (ref 2.5–4.9)
POTASSIUM SERPL-SCNC: 4.2 MMOL/L (ref 3.5–5.3)
PROT SERPL-MCNC: 7 G/DL (ref 6.4–8.2)
SODIUM SERPL-SCNC: 142 MMOL/L (ref 136–145)

## 2023-10-11 ENCOUNTER — TELEPHONE (OUTPATIENT)
Dept: PRIMARY CARE | Facility: CLINIC | Age: 88
End: 2023-10-11
Payer: MEDICARE

## 2023-10-11 NOTE — TELEPHONE ENCOUNTER
Daughter left a voicemail stating you prescribed Voltaren gel last week for her knee, concerned about the warnings it mentions risk of heart disease, may interfere with A- fib or flutter. Wants to make sure it is okay to use to this on a daily basis ?       [phone]

## 2023-10-26 DIAGNOSIS — E11.9 CONTROLLED TYPE 2 DIABETES MELLITUS WITHOUT COMPLICATION, WITHOUT LONG-TERM CURRENT USE OF INSULIN (MULTI): Primary | ICD-10-CM

## 2023-10-26 DIAGNOSIS — I48.92 ATRIAL FLUTTER, UNSPECIFIED TYPE (MULTI): ICD-10-CM

## 2023-10-27 RX ORDER — METFORMIN HYDROCHLORIDE 500 MG/1
1000 TABLET ORAL 2 TIMES DAILY
Qty: 360 TABLET | Refills: 3 | Status: SHIPPED | OUTPATIENT
Start: 2023-10-27

## 2023-10-27 RX ORDER — METOPROLOL TARTRATE 25 MG/1
25 TABLET, FILM COATED ORAL 2 TIMES DAILY
Qty: 180 TABLET | Refills: 3 | Status: SHIPPED | OUTPATIENT
Start: 2023-10-27

## 2023-11-07 DIAGNOSIS — R60.0 EDEMA OF BOTH LOWER EXTREMITIES: ICD-10-CM

## 2023-11-07 RX ORDER — FUROSEMIDE 20 MG/1
20 TABLET ORAL DAILY
Qty: 30 TABLET | Refills: 0 | Status: SHIPPED | OUTPATIENT
Start: 2023-11-07 | End: 2023-12-29 | Stop reason: SDUPTHER

## 2023-11-28 DIAGNOSIS — I48.92 ATRIAL FLUTTER, UNSPECIFIED TYPE (MULTI): Primary | ICD-10-CM

## 2023-11-29 RX ORDER — DILTIAZEM HYDROCHLORIDE 180 MG/1
180 CAPSULE, COATED, EXTENDED RELEASE ORAL 2 TIMES DAILY
Qty: 180 CAPSULE | Refills: 0 | Status: SHIPPED | OUTPATIENT
Start: 2023-11-29 | End: 2024-04-29

## 2023-12-29 DIAGNOSIS — R60.0 EDEMA OF BOTH LOWER EXTREMITIES: ICD-10-CM

## 2023-12-29 RX ORDER — FUROSEMIDE 20 MG/1
20 TABLET ORAL DAILY
Qty: 30 TABLET | Refills: 0 | Status: SHIPPED | OUTPATIENT
Start: 2023-12-29 | End: 2024-01-26 | Stop reason: SDUPTHER

## 2024-01-26 DIAGNOSIS — R60.0 EDEMA OF BOTH LOWER EXTREMITIES: ICD-10-CM

## 2024-01-26 RX ORDER — FUROSEMIDE 20 MG/1
20 TABLET ORAL DAILY
Qty: 90 TABLET | Refills: 0 | Status: SHIPPED | OUTPATIENT
Start: 2024-01-26 | End: 2024-01-29

## 2024-01-28 DIAGNOSIS — R60.0 EDEMA OF BOTH LOWER EXTREMITIES: ICD-10-CM

## 2024-01-29 RX ORDER — FUROSEMIDE 20 MG/1
20 TABLET ORAL DAILY
Qty: 30 TABLET | Refills: 0 | Status: SHIPPED | OUTPATIENT
Start: 2024-01-29 | End: 2024-04-22

## 2024-02-02 DIAGNOSIS — M10.9 GOUT, UNSPECIFIED CAUSE, UNSPECIFIED CHRONICITY, UNSPECIFIED SITE: ICD-10-CM

## 2024-02-02 DIAGNOSIS — I48.92 ATRIAL FLUTTER, UNSPECIFIED TYPE (MULTI): ICD-10-CM

## 2024-02-02 DIAGNOSIS — E78.01 FAMILIAL HYPERCHOLESTEROLEMIA: Primary | ICD-10-CM

## 2024-02-03 RX ORDER — ACETAMINOPHEN 500 MG
1 TABLET ORAL
COMMUNITY
Start: 2023-09-27

## 2024-02-05 RX ORDER — SIMVASTATIN 10 MG/1
10 TABLET, FILM COATED ORAL NIGHTLY
Qty: 90 TABLET | Refills: 3 | Status: SHIPPED | OUTPATIENT
Start: 2024-02-05

## 2024-02-05 RX ORDER — APIXABAN 2.5 MG/1
2.5 TABLET, FILM COATED ORAL 2 TIMES DAILY
Qty: 180 TABLET | Refills: 3 | Status: SHIPPED | OUTPATIENT
Start: 2024-02-05

## 2024-02-05 RX ORDER — ALLOPURINOL 300 MG/1
300 TABLET ORAL DAILY
Qty: 90 TABLET | Refills: 3 | Status: SHIPPED | OUTPATIENT
Start: 2024-02-05

## 2024-02-29 ENCOUNTER — TELEPHONE (OUTPATIENT)
Dept: PRIMARY CARE | Facility: CLINIC | Age: 89
End: 2024-02-29
Payer: MEDICARE

## 2024-02-29 DIAGNOSIS — E55.9 VITAMIN D DEFICIENCY: ICD-10-CM

## 2024-02-29 DIAGNOSIS — E78.01 FAMILIAL HYPERCHOLESTEROLEMIA: ICD-10-CM

## 2024-02-29 DIAGNOSIS — E11.9 CONTROLLED TYPE 2 DIABETES MELLITUS WITHOUT COMPLICATION, WITHOUT LONG-TERM CURRENT USE OF INSULIN (MULTI): ICD-10-CM

## 2024-02-29 DIAGNOSIS — I10 PRIMARY HYPERTENSION: ICD-10-CM

## 2024-02-29 NOTE — TELEPHONE ENCOUNTER
Please schedule patient for [facility] wellness visit in June 2024. Schedule with Dr. Zhang Please send this encounter to Dr. Zhang for lab orders once scheduled.     Thank you-  Michael Chiang CMA  2/29/2024  Practice Supervisor  [facility]

## 2024-03-15 ENCOUNTER — APPOINTMENT (OUTPATIENT)
Dept: PRIMARY CARE | Facility: CLINIC | Age: 89
End: 2024-03-15
Payer: MEDICARE

## 2024-04-20 DIAGNOSIS — R60.0 EDEMA OF BOTH LOWER EXTREMITIES: ICD-10-CM

## 2024-04-22 RX ORDER — FUROSEMIDE 20 MG/1
20 TABLET ORAL DAILY
Qty: 90 TABLET | Refills: 0 | Status: SHIPPED | OUTPATIENT
Start: 2024-04-22 | End: 2024-05-28

## 2024-04-27 DIAGNOSIS — I48.92 ATRIAL FLUTTER, UNSPECIFIED TYPE (MULTI): ICD-10-CM

## 2024-04-29 RX ORDER — DILTIAZEM HYDROCHLORIDE 180 MG/1
180 CAPSULE, COATED, EXTENDED RELEASE ORAL 2 TIMES DAILY
Qty: 180 CAPSULE | Refills: 3 | Status: SHIPPED | OUTPATIENT
Start: 2024-04-29

## 2024-05-26 DIAGNOSIS — R60.0 EDEMA OF BOTH LOWER EXTREMITIES: ICD-10-CM

## 2024-05-28 RX ORDER — FUROSEMIDE 20 MG/1
20 TABLET ORAL DAILY
Qty: 30 TABLET | Refills: 11 | Status: SHIPPED | OUTPATIENT
Start: 2024-05-28

## 2024-05-30 ENCOUNTER — TELEPHONE (OUTPATIENT)
Dept: PRIMARY CARE | Facility: CLINIC | Age: 89
End: 2024-05-30
Payer: MEDICARE

## 2024-05-30 NOTE — TELEPHONE ENCOUNTER
Pt's daughter Angelica called to reschedule Jessie's  PE appointment due to her not being able to push the wheelchair for her mom.    Next available was 7/26 which I did schedule however she asked me to let you know in case you wanted to see her sooner.    Angelica # [phone]

## 2024-06-04 ENCOUNTER — APPOINTMENT (OUTPATIENT)
Dept: PRIMARY CARE | Facility: CLINIC | Age: 89
End: 2024-06-04
Payer: MEDICARE

## 2024-07-23 ENCOUNTER — TELEPHONE (OUTPATIENT)
Dept: PRIMARY CARE | Facility: CLINIC | Age: 89
End: 2024-07-23
Payer: MEDICARE

## 2024-07-23 NOTE — TELEPHONE ENCOUNTER
Pt daughter called in asking if the blood work can be done the morning of her appointment? Its difficult for them to bring her in before 7/26/24. Please advise    Gooxaqi-[phone]

## 2024-07-26 ENCOUNTER — APPOINTMENT (OUTPATIENT)
Dept: PRIMARY CARE | Facility: CLINIC | Age: 89
End: 2024-07-26
Payer: MEDICARE

## 2024-07-26 ENCOUNTER — LAB (OUTPATIENT)
Dept: LAB | Facility: LAB | Age: 89
End: 2024-07-26
Payer: MEDICARE

## 2024-07-26 VITALS
WEIGHT: 184 LBS | SYSTOLIC BLOOD PRESSURE: 130 MMHG | BODY MASS INDEX: 41.39 KG/M2 | HEART RATE: 61 BPM | DIASTOLIC BLOOD PRESSURE: 62 MMHG | HEIGHT: 56 IN

## 2024-07-26 DIAGNOSIS — I48.92 ATRIAL FLUTTER, UNSPECIFIED TYPE (MULTI): ICD-10-CM

## 2024-07-26 DIAGNOSIS — I13.0 HYPERTENSIVE HEART AND CHRONIC KIDNEY DISEASE WITH HEART FAILURE AND STAGE 1 THROUGH STAGE 4 CHRONIC KIDNEY DISEASE, OR UNSPECIFIED CHRONIC KIDNEY DISEASE (MULTI): ICD-10-CM

## 2024-07-26 DIAGNOSIS — Z78.0 ASYMPTOMATIC MENOPAUSAL STATE: ICD-10-CM

## 2024-07-26 DIAGNOSIS — J84.10 CALCIFIED GRANULOMA OF LUNG (MULTI): ICD-10-CM

## 2024-07-26 DIAGNOSIS — E11.42 DIABETIC POLYNEUROPATHY ASSOCIATED WITH TYPE 2 DIABETES MELLITUS (MULTI): ICD-10-CM

## 2024-07-26 DIAGNOSIS — Z71.89 ADVANCE DIRECTIVE DISCUSSED WITH PATIENT: ICD-10-CM

## 2024-07-26 DIAGNOSIS — I51.7 CARDIOMEGALY: ICD-10-CM

## 2024-07-26 DIAGNOSIS — E55.9 VITAMIN D DEFICIENCY: ICD-10-CM

## 2024-07-26 DIAGNOSIS — I10 PRIMARY HYPERTENSION: ICD-10-CM

## 2024-07-26 DIAGNOSIS — I50.9 ACUTE ON CHRONIC CONGESTIVE HEART FAILURE, UNSPECIFIED HEART FAILURE TYPE (MULTI): ICD-10-CM

## 2024-07-26 DIAGNOSIS — Z00.00 HEALTHCARE MAINTENANCE: Primary | ICD-10-CM

## 2024-07-26 DIAGNOSIS — I50.23 ACUTE ON CHRONIC SYSTOLIC CHF (CONGESTIVE HEART FAILURE) (MULTI): ICD-10-CM

## 2024-07-26 DIAGNOSIS — Z00.00 ROUTINE GENERAL MEDICAL EXAMINATION AT HEALTH CARE FACILITY: ICD-10-CM

## 2024-07-26 DIAGNOSIS — E11.9 CONTROLLED TYPE 2 DIABETES MELLITUS WITHOUT COMPLICATION, WITHOUT LONG-TERM CURRENT USE OF INSULIN (MULTI): ICD-10-CM

## 2024-07-26 DIAGNOSIS — I48.20 CHRONIC ATRIAL FIBRILLATION, UNSPECIFIED (MULTI): ICD-10-CM

## 2024-07-26 DIAGNOSIS — Z12.31 ENCOUNTER FOR SCREENING MAMMOGRAM FOR BREAST CANCER: ICD-10-CM

## 2024-07-26 DIAGNOSIS — E11.40 TYPE 2 DIABETES MELLITUS WITH DIABETIC NEUROPATHY, WITHOUT LONG-TERM CURRENT USE OF INSULIN (MULTI): ICD-10-CM

## 2024-07-26 DIAGNOSIS — E78.01 FAMILIAL HYPERCHOLESTEROLEMIA: ICD-10-CM

## 2024-07-26 DIAGNOSIS — M10.9 GOUT, UNSPECIFIED CAUSE, UNSPECIFIED CHRONICITY, UNSPECIFIED SITE: ICD-10-CM

## 2024-07-26 DIAGNOSIS — J84.10 FIBROSIS OF LUNG (MULTI): ICD-10-CM

## 2024-07-26 DIAGNOSIS — M81.0 OSTEOPOROSIS, UNSPECIFIED OSTEOPOROSIS TYPE, UNSPECIFIED PATHOLOGICAL FRACTURE PRESENCE: ICD-10-CM

## 2024-07-26 DIAGNOSIS — I50.30 DIASTOLIC HEART FAILURE, UNSPECIFIED HF CHRONICITY (MULTI): ICD-10-CM

## 2024-07-26 DIAGNOSIS — M81.0 AGE-RELATED OSTEOPOROSIS WITHOUT CURRENT PATHOLOGICAL FRACTURE: ICD-10-CM

## 2024-07-26 DIAGNOSIS — N18.30 HYPERTENSIVE KIDNEY DISEASE WITH STAGE 3 CHRONIC KIDNEY DISEASE, UNSPECIFIED WHETHER STAGE 3A OR 3B CKD (MULTI): ICD-10-CM

## 2024-07-26 DIAGNOSIS — I12.9 HYPERTENSIVE KIDNEY DISEASE WITH STAGE 3 CHRONIC KIDNEY DISEASE, UNSPECIFIED WHETHER STAGE 3A OR 3B CKD (MULTI): ICD-10-CM

## 2024-07-26 DIAGNOSIS — R53.81 DEBILITY: ICD-10-CM

## 2024-07-26 PROBLEM — E66.812 OBESITY, CLASS II, BMI 35-39.9: Status: ACTIVE | Noted: 2023-11-20

## 2024-07-26 PROBLEM — R00.2 PALPITATIONS: Status: RESOLVED | Noted: 2023-06-19 | Resolved: 2024-07-26

## 2024-07-26 PROBLEM — R60.0 LOCALIZED EDEMA: Status: RESOLVED | Noted: 2023-06-25 | Resolved: 2024-07-26

## 2024-07-26 PROBLEM — E66.9 OBESITY, CLASS II, BMI 35-39.9: Status: ACTIVE | Noted: 2023-11-20

## 2024-07-26 PROBLEM — N17.9 AKI (ACUTE KIDNEY INJURY) (CMS-HCC): Status: RESOLVED | Noted: 2023-06-26 | Resolved: 2024-07-26

## 2024-07-26 PROCEDURE — 1036F TOBACCO NON-USER: CPT | Performed by: FAMILY MEDICINE

## 2024-07-26 PROCEDURE — 99497 ADVNCD CARE PLAN 30 MIN: CPT | Performed by: FAMILY MEDICINE

## 2024-07-26 PROCEDURE — 80053 COMPREHEN METABOLIC PANEL: CPT

## 2024-07-26 PROCEDURE — 1160F RVW MEDS BY RX/DR IN RCRD: CPT | Performed by: FAMILY MEDICINE

## 2024-07-26 PROCEDURE — 84443 ASSAY THYROID STIM HORMONE: CPT

## 2024-07-26 PROCEDURE — 83540 ASSAY OF IRON: CPT

## 2024-07-26 PROCEDURE — 85025 COMPLETE CBC W/AUTO DIFF WBC: CPT

## 2024-07-26 PROCEDURE — 80061 LIPID PANEL: CPT

## 2024-07-26 PROCEDURE — 82306 VITAMIN D 25 HYDROXY: CPT

## 2024-07-26 PROCEDURE — G0439 PPPS, SUBSEQ VISIT: HCPCS | Performed by: FAMILY MEDICINE

## 2024-07-26 PROCEDURE — 1159F MED LIST DOCD IN RCRD: CPT | Performed by: FAMILY MEDICINE

## 2024-07-26 PROCEDURE — 1123F ACP DISCUSS/DSCN MKR DOCD: CPT | Performed by: FAMILY MEDICINE

## 2024-07-26 PROCEDURE — 3078F DIAST BP <80 MM HG: CPT | Performed by: FAMILY MEDICINE

## 2024-07-26 PROCEDURE — 1170F FXNL STATUS ASSESSED: CPT | Performed by: FAMILY MEDICINE

## 2024-07-26 PROCEDURE — 36415 COLL VENOUS BLD VENIPUNCTURE: CPT

## 2024-07-26 PROCEDURE — 99214 OFFICE O/P EST MOD 30 MIN: CPT | Performed by: FAMILY MEDICINE

## 2024-07-26 PROCEDURE — 83550 IRON BINDING TEST: CPT

## 2024-07-26 PROCEDURE — 99397 PER PM REEVAL EST PAT 65+ YR: CPT | Performed by: FAMILY MEDICINE

## 2024-07-26 PROCEDURE — 3075F SYST BP GE 130 - 139MM HG: CPT | Performed by: FAMILY MEDICINE

## 2024-07-26 PROCEDURE — 83036 HEMOGLOBIN GLYCOSYLATED A1C: CPT

## 2024-07-26 PROCEDURE — 1158F ADVNC CARE PLAN TLK DOCD: CPT | Performed by: FAMILY MEDICINE

## 2024-07-26 PROCEDURE — 82728 ASSAY OF FERRITIN: CPT

## 2024-07-26 ASSESSMENT — ENCOUNTER SYMPTOMS
APPETITE CHANGE: 0
PALPITATIONS: 0
BACK PAIN: 0
FEVER: 0
FATIGUE: 0
HEADACHES: 0
COLOR CHANGE: 0
OCCASIONAL FEELINGS OF UNSTEADINESS: 1
DIZZINESS: 0
COUGH: 0
LIGHT-HEADEDNESS: 0
CHEST TIGHTNESS: 0
DEPRESSION: 0
LOSS OF SENSATION IN FEET: 1
SHORTNESS OF BREATH: 1
FACIAL ASYMMETRY: 0
ACTIVITY CHANGE: 0
ARTHRALGIAS: 0
CHOKING: 0

## 2024-07-26 ASSESSMENT — ACTIVITIES OF DAILY LIVING (ADL)
DRESSING: INDEPENDENT
MANAGING_FINANCES: INDEPENDENT
BATHING: INDEPENDENT
TAKING_MEDICATION: INDEPENDENT
GROCERY_SHOPPING: NEEDS ASSISTANCE
DOING_HOUSEWORK: NEEDS ASSISTANCE

## 2024-07-26 ASSESSMENT — PATIENT HEALTH QUESTIONNAIRE - PHQ9
SUM OF ALL RESPONSES TO PHQ9 QUESTIONS 1 AND 2: 0
2. FEELING DOWN, DEPRESSED OR HOPELESS: NOT AT ALL
10. IF YOU CHECKED OFF ANY PROBLEMS, HOW DIFFICULT HAVE THESE PROBLEMS MADE IT FOR YOU TO DO YOUR WORK, TAKE CARE OF THINGS AT HOME, OR GET ALONG WITH OTHER PEOPLE: NOT DIFFICULT AT ALL
1. LITTLE INTEREST OR PLEASURE IN DOING THINGS: NOT AT ALL

## 2024-07-26 NOTE — ASSESSMENT & PLAN NOTE
>>ASSESSMENT AND PLAN FOR ACUTE ON CHRONIC CONGESTIVE HEART FAILURE (MULTI) WRITTEN ON 7/26/2024  7:11 AM BY JAMA BALES DO    On lasix  stable

## 2024-07-26 NOTE — PROGRESS NOTES
Subjective   Reason for Visit: Jessie Bravo is an 89 y.o. female here for a Medicare Wellness visit.     Past Medical, Surgical, and Family History reviewed and updated in chart.    Reviewed all medications by prescribing practitioner or clinical pharmacist (such as prescriptions, OTCs, herbal therapies and supplements) and documented in the medical record.    HPI  Patient presents for physical exam no pap.      Fam Hx  Mom (86) , CAD, CHF, DMII  Dad (41)  new years day, PE vs ACS?     OB/GYN [facility]  Cardiologist, Dr. Morelos     Exercise walks  ETOH denies  Caffeine 1 cup coffee/day, hot tea once a week  Tobacco quit at 31, 15 years >1ppd     she has a place in [place], [facility]     Mammogram due   DEXA due  osteopenia  Colonoscopy refuses at this time     Patient denies other complaints.   Patient Self Assessment of Health Status  Patient Self Assessment: Good    Nutrition and Exercise  Current Diet: Unhealthy Diet  Adequate Fluid Intake: Yes  Caffeine: Yes  Exercise Frequency: No Exercise    Functional Ability/Level of Safety  Cognitive Impairment Observed: No cognitive impairment observed  Cognitive Impairment Reported: No cognitive impairment reported by patient or family    Home Safety Risk Factors: None    Patient Care Team:  Laura Zhang DO as PCP - General  Jay Mcgowan MD as Referring Physician (Cardiology)  Beka Morelos MD as Referring Physician (Cardiology)     Review of Systems   Constitutional:  Negative for activity change, appetite change, fatigue and fever.   HENT:  Negative for congestion.    Respiratory:  Positive for shortness of breath. Negative for cough, choking and chest tightness.    Cardiovascular:  Positive for leg swelling. Negative for chest pain and palpitations.   Musculoskeletal:  Negative for arthralgias, back pain and gait problem.   Skin:  Negative for color change and pallor.   Neurological:  Negative for dizziness, facial  "asymmetry, light-headedness and headaches.       Objective   Vitals:  /62 (BP Location: Right arm, Patient Position: Sitting)   Pulse 61   Ht 1.422 m (4' 8\")   Wt 83.5 kg (184 lb)   BMI 41.25 kg/m²       Physical Exam  Constitutional:       General: She is not in acute distress.     Appearance: Normal appearance. She is not toxic-appearing.   HENT:      Head: Normocephalic.      Right Ear: Tympanic membrane, ear canal and external ear normal.      Left Ear: Tympanic membrane, ear canal and external ear normal.      Nose: Nose normal.      Mouth/Throat:      Pharynx: Oropharynx is clear.   Eyes:      Conjunctiva/sclera: Conjunctivae normal.      Pupils: Pupils are equal, round, and reactive to light.   Cardiovascular:      Rate and Rhythm: Normal rate. Rhythm irregular.      Pulses: Normal pulses.      Heart sounds: Murmur heard.   Pulmonary:      Effort: No respiratory distress.      Breath sounds: No wheezing, rhonchi or rales.   Abdominal:      General: Bowel sounds are normal. There is no distension.      Palpations: Abdomen is soft.      Tenderness: There is no abdominal tenderness.   Musculoskeletal:         General: No swelling or tenderness.      Cervical back: No tenderness.      Right lower leg: Edema present.      Left lower leg: Edema present.      Comments: +1 pitting edema bilateral lower extremities   Skin:     Findings: No lesion or rash.   Neurological:      General: No focal deficit present.      Mental Status: She is alert and oriented to person, place, and time. Mental status is at baseline.      Gait: Gait normal.   Psychiatric:         Mood and Affect: Mood normal.         Behavior: Behavior normal.         Thought Content: Thought content normal.         Judgment: Judgment normal.         Assessment/Plan   Problem List Items Addressed This Visit       Diabetes mellitus (Multi)    Current Assessment & Plan     Hgba1c is unavailable         Atrial flutter (Multi)    Current Assessment & " Plan     Follows with cardiology  stable         Hyperlipidemia    Hypertension    Osteoporosis    Vitamin D deficiency    Calcified granuloma of lung (Multi)    Current Assessment & Plan     Seen on CT scan  stable         Diabetic neuropathy (Multi)    Current Assessment & Plan     Hgba1c is unavailable at this office visit         Diastolic heart failure (Multi)    Current Assessment & Plan     On lasix  stable         Cardiomegaly    Gout    Hypertensive kidney disease with CKD stage III (Multi)    Current Assessment & Plan     Creatinine is unavailable           Fibrosis of lung (Multi)    Current Assessment & Plan     Seen on CT scan  stable         Acute on chronic systolic CHF (congestive heart failure) (Multi)    Current Assessment & Plan     >>ASSESSMENT AND PLAN FOR ACUTE ON CHRONIC CONGESTIVE HEART FAILURE (MULTI) WRITTEN ON 7/26/2024  7:11 AM BY JAMA BALES, DO    On lasix  stable         Age-related osteoporosis without current pathological fracture    Chronic atrial fibrillation, unspecified (Multi)    Current Assessment & Plan     Follows with cardiology  stable         Hypertensive heart and chronic kidney disease with heart failure and stage 1 through stage 4 chronic kidney disease, or unspecified chronic kidney disease (Multi)    Current Assessment & Plan     Follows with cardiology  stable          Other Visit Diagnoses       Healthcare maintenance    -  Primary    Acute on chronic congestive heart failure, unspecified heart failure type (Multi)            1. Patient's blood work discussed at this office visit  2. Patient's LDL goal <70, current LDL unavailable  3. Patient's TG goal <150, current TG unavailable, continue on TYPE IV diet  4. HgbA1c goal <6.5, current unavailable continue diet and exercise  5. Vitamin d level is unavailable, continue on vitamin D replacement daily  6. Patient's potassium level is unavailable  7. Mammogram due 2024  8. DEXA due 2024 osteopenia  9. Colonoscopy  refuses at this time, secondary to age  10. Patient to call if questions or concerns

## 2024-07-26 NOTE — ACP (ADVANCE CARE PLANNING)
Confirming Previous Code Status:   Advance Care Planning Note     Discussion Date: 07/26/24   Discussion Participants: patient    The patient wishes to discuss Advance Care Planning today and the following is a brief summary of our discussion.     Patient has capacity to make their own medical decisions: Yes  Health Care Agent/Surrogate Decision Maker documented in chart: Yes    Documents on file and valid:  Advance Directive/Living Will: Yes   Health Care Power of : Yes  Other:     Communication of Medical Status/Prognosis:   stable    Communication of Treatment Goals/Options:   stable    Treatment Decisions  Goals of Care: quality of life is prioritized; willing to accept low-burden treatments to achieve meaningful goals     Follow Up Plan  stable  Team Members  stable  Time Statement: Total face to face time spent on advance care planning was 5 minutes with 16 minutes spent in counseling, including the explanation.    Laura Zhang DO  7/26/2024 10:36 AM

## 2024-07-27 LAB
25(OH)D3 SERPL-MCNC: 52 NG/ML (ref 30–100)
ALBUMIN SERPL BCP-MCNC: 4.2 G/DL (ref 3.4–5)
ALP SERPL-CCNC: 55 U/L (ref 33–136)
ALT SERPL W P-5'-P-CCNC: 7 U/L (ref 7–45)
ANION GAP SERPL CALC-SCNC: 15 MMOL/L (ref 10–20)
AST SERPL W P-5'-P-CCNC: 14 U/L (ref 9–39)
BASOPHILS # BLD AUTO: 0.04 X10*3/UL (ref 0–0.1)
BASOPHILS NFR BLD AUTO: 0.4 %
BILIRUB SERPL-MCNC: 0.5 MG/DL (ref 0–1.2)
BUN SERPL-MCNC: 21 MG/DL (ref 6–23)
CALCIUM SERPL-MCNC: 9.5 MG/DL (ref 8.6–10.6)
CHLORIDE SERPL-SCNC: 94 MMOL/L (ref 98–107)
CHOLEST SERPL-MCNC: 112 MG/DL (ref 0–199)
CHOLESTEROL/HDL RATIO: 3
CO2 SERPL-SCNC: 33 MMOL/L (ref 21–32)
CREAT SERPL-MCNC: 0.96 MG/DL (ref 0.5–1.05)
EGFRCR SERPLBLD CKD-EPI 2021: 57 ML/MIN/1.73M*2
EOSINOPHIL # BLD AUTO: 0.22 X10*3/UL (ref 0–0.4)
EOSINOPHIL NFR BLD AUTO: 2.2 %
ERYTHROCYTE [DISTWIDTH] IN BLOOD BY AUTOMATED COUNT: 14.8 % (ref 11.5–14.5)
EST. AVERAGE GLUCOSE BLD GHB EST-MCNC: 126 MG/DL
GLUCOSE SERPL-MCNC: 128 MG/DL (ref 74–99)
HBA1C MFR BLD: 6 %
HCT VFR BLD AUTO: 35.4 % (ref 36–46)
HDLC SERPL-MCNC: 37 MG/DL
HGB BLD-MCNC: 10.4 G/DL (ref 12–16)
IMM GRANULOCYTES # BLD AUTO: 0.04 X10*3/UL (ref 0–0.5)
IMM GRANULOCYTES NFR BLD AUTO: 0.4 % (ref 0–0.9)
LDLC SERPL CALC-MCNC: 46 MG/DL
LYMPHOCYTES # BLD AUTO: 1.42 X10*3/UL (ref 0.8–3)
LYMPHOCYTES NFR BLD AUTO: 14.2 %
MCH RBC QN AUTO: 26.9 PG (ref 26–34)
MCHC RBC AUTO-ENTMCNC: 29.4 G/DL (ref 32–36)
MCV RBC AUTO: 92 FL (ref 80–100)
MONOCYTES # BLD AUTO: 0.73 X10*3/UL (ref 0.05–0.8)
MONOCYTES NFR BLD AUTO: 7.3 %
NEUTROPHILS # BLD AUTO: 7.57 X10*3/UL (ref 1.6–5.5)
NEUTROPHILS NFR BLD AUTO: 75.5 %
NON HDL CHOLESTEROL: 75 MG/DL (ref 0–149)
NRBC BLD-RTO: 0 /100 WBCS (ref 0–0)
PLATELET # BLD AUTO: 323 X10*3/UL (ref 150–450)
POTASSIUM SERPL-SCNC: 4 MMOL/L (ref 3.5–5.3)
PROT SERPL-MCNC: 7.2 G/DL (ref 6.4–8.2)
RBC # BLD AUTO: 3.86 X10*6/UL (ref 4–5.2)
SODIUM SERPL-SCNC: 138 MMOL/L (ref 136–145)
TRIGL SERPL-MCNC: 146 MG/DL (ref 0–149)
TSH SERPL-ACNC: 1.27 MIU/L (ref 0.44–3.98)
VLDL: 29 MG/DL (ref 0–40)
WBC # BLD AUTO: 10 X10*3/UL (ref 4.4–11.3)

## 2024-07-31 DIAGNOSIS — D64.9 ANEMIA, UNSPECIFIED TYPE: ICD-10-CM

## 2024-07-31 LAB
FERRITIN SERPL-MCNC: 52 NG/ML (ref 8–150)
IRON SATN MFR SERPL: 16 % (ref 25–45)
IRON SERPL-MCNC: 52 UG/DL (ref 35–150)
TIBC SERPL-MCNC: 329 UG/DL (ref 240–445)
UIBC SERPL-MCNC: 277 UG/DL (ref 110–370)

## 2024-08-06 DIAGNOSIS — D64.9 ANEMIA, UNSPECIFIED TYPE: ICD-10-CM

## 2024-09-05 DIAGNOSIS — E11.9 CONTROLLED TYPE 2 DIABETES MELLITUS WITHOUT COMPLICATION, WITHOUT LONG-TERM CURRENT USE OF INSULIN (MULTI): ICD-10-CM

## 2024-09-05 DIAGNOSIS — I48.92 ATRIAL FLUTTER, UNSPECIFIED TYPE (MULTI): ICD-10-CM

## 2024-09-06 DIAGNOSIS — E11.9 CONTROLLED TYPE 2 DIABETES MELLITUS WITHOUT COMPLICATION, WITHOUT LONG-TERM CURRENT USE OF INSULIN (MULTI): ICD-10-CM

## 2024-09-06 RX ORDER — METFORMIN HYDROCHLORIDE 500 MG/1
1000 TABLET ORAL 2 TIMES DAILY
Qty: 360 TABLET | Refills: 3 | Status: SHIPPED | OUTPATIENT
Start: 2024-09-06

## 2024-09-06 RX ORDER — METOPROLOL TARTRATE 25 MG/1
25 TABLET, FILM COATED ORAL 2 TIMES DAILY
Qty: 180 TABLET | Refills: 3 | Status: SHIPPED | OUTPATIENT
Start: 2024-09-06

## 2024-12-13 DIAGNOSIS — I48.92 ATRIAL FLUTTER, UNSPECIFIED TYPE (MULTI): ICD-10-CM

## 2024-12-13 DIAGNOSIS — M10.9 GOUT, UNSPECIFIED CAUSE, UNSPECIFIED CHRONICITY, UNSPECIFIED SITE: ICD-10-CM

## 2024-12-13 DIAGNOSIS — E78.01 FAMILIAL HYPERCHOLESTEROLEMIA: ICD-10-CM

## 2024-12-16 RX ORDER — SIMVASTATIN 10 MG/1
10 TABLET, FILM COATED ORAL NIGHTLY
Qty: 90 TABLET | Refills: 3 | Status: SHIPPED | OUTPATIENT
Start: 2024-12-16

## 2024-12-16 RX ORDER — ALLOPURINOL 300 MG/1
300 TABLET ORAL DAILY
Qty: 90 TABLET | Refills: 3 | Status: SHIPPED | OUTPATIENT
Start: 2024-12-16

## 2024-12-16 RX ORDER — APIXABAN 2.5 MG/1
2.5 TABLET, FILM COATED ORAL 2 TIMES DAILY
Qty: 180 TABLET | Refills: 3 | Status: SHIPPED | OUTPATIENT
Start: 2024-12-16

## 2024-12-23 ENCOUNTER — TELEPHONE (OUTPATIENT)
Dept: PRIMARY CARE | Facility: CLINIC | Age: 89
End: 2024-12-23
Payer: MEDICARE

## 2024-12-23 NOTE — TELEPHONE ENCOUNTER
Pt's daughter called because pt is currently on oxygen and the setting is at 3 when she is up and active her pulse op is moving down into the 80's, She would like to move the oxygen level up she would like to know what you recommend.     Please call daughter Lisa [phone]

## 2024-12-24 DIAGNOSIS — R09.02 HYPOXIA: ICD-10-CM

## 2024-12-24 DIAGNOSIS — J84.10 FIBROSIS OF LUNG (MULTI): ICD-10-CM

## 2024-12-24 DIAGNOSIS — R06.09 DYSPNEA ON EXERTION: ICD-10-CM

## 2025-02-24 ENCOUNTER — TELEPHONE (OUTPATIENT)
Dept: PRIMARY CARE | Facility: CLINIC | Age: 89
End: 2025-02-24
Payer: MEDICARE

## 2025-02-24 DIAGNOSIS — R60.0 EDEMA OF BOTH LOWER EXTREMITIES: ICD-10-CM

## 2025-02-24 RX ORDER — FUROSEMIDE 40 MG/1
40 TABLET ORAL DAILY
Qty: 90 TABLET | Refills: 3 | Status: SHIPPED | OUTPATIENT
Start: 2025-02-24

## 2025-02-24 NOTE — PROGRESS NOTES
After talking with daughter, she says that her mother has water up to her thighs per Dr. Oshea. The recommendation was to increase her lasix to 40mg daily. She was agreeable to this. She is going to have several breathing tests performed with Dr. Oshea March 10th. She will recheck cmp in 5 days.    SURVEY:    You may be receiving a survey from [employer] regarding your visit today. Please complete the survey to enable us to provide the highest quality of care to you and your family. If you cannot score us a very good on any question, please call the office to discuss how we could have made your experience a very good one. Thank you.

## 2025-02-24 NOTE — TELEPHONE ENCOUNTER
Angelica left a voicemail stating she recently saw Dr Oshea last week, states she is holding fluids, thinks it is going part way up her thighs. Recommend her take 2 diuretics instead of one. Patient has an upcoming appt with Cardiology. Daughter would like for you to take a look at the notes and is requesting a call back.

## 2025-03-08 DIAGNOSIS — I48.92 ATRIAL FLUTTER, UNSPECIFIED TYPE (MULTI): ICD-10-CM

## 2025-03-10 RX ORDER — DILTIAZEM HYDROCHLORIDE 180 MG/1
180 CAPSULE, COATED, EXTENDED RELEASE ORAL 2 TIMES DAILY
Qty: 180 CAPSULE | Refills: 3 | Status: SHIPPED | OUTPATIENT
Start: 2025-03-10

## 2025-07-22 DIAGNOSIS — E11.9 CONTROLLED TYPE 2 DIABETES MELLITUS WITHOUT COMPLICATION, WITHOUT LONG-TERM CURRENT USE OF INSULIN: ICD-10-CM

## 2025-07-22 DIAGNOSIS — I48.92 ATRIAL FLUTTER, UNSPECIFIED TYPE (MULTI): ICD-10-CM

## 2025-07-23 RX ORDER — METFORMIN HYDROCHLORIDE 500 MG/1
1000 TABLET ORAL 2 TIMES DAILY
Qty: 360 TABLET | Refills: 3 | Status: SHIPPED | OUTPATIENT
Start: 2025-07-23

## 2025-07-23 RX ORDER — METOPROLOL TARTRATE 25 MG/1
25 TABLET, FILM COATED ORAL 2 TIMES DAILY
Qty: 180 TABLET | Refills: 3 | Status: SHIPPED | OUTPATIENT
Start: 2025-07-23

## 2025-07-28 ENCOUNTER — APPOINTMENT (OUTPATIENT)
Dept: PRIMARY CARE | Facility: CLINIC | Age: 89
End: 2025-07-28
Payer: MEDICARE

## 2025-07-28 PROBLEM — I10 BENIGN ESSENTIAL HYPERTENSION: Status: ACTIVE | Noted: 2025-07-28

## 2025-07-28 PROBLEM — I50.23 ACUTE ON CHRONIC SYSTOLIC CHF (CONGESTIVE HEART FAILURE): Status: RESOLVED | Noted: 2023-06-25 | Resolved: 2025-07-28

## 2025-07-28 PROBLEM — I10 BENIGN ESSENTIAL HYPERTENSION: Status: ACTIVE | Noted: 2023-05-25

## 2025-07-28 PROBLEM — I48.20 CHRONIC ATRIAL FIBRILLATION, UNSPECIFIED (MULTI): Status: ACTIVE | Noted: 2023-05-25

## 2025-07-28 PROBLEM — R06.00 DYSPNEA: Status: ACTIVE | Noted: 2023-06-25

## 2025-07-28 PROBLEM — M81.0 OSTEOPOROSIS: Status: RESOLVED | Noted: 2023-05-25 | Resolved: 2025-07-28

## 2025-08-12 DIAGNOSIS — E11.42 DIABETIC POLYNEUROPATHY ASSOCIATED WITH TYPE 2 DIABETES MELLITUS: ICD-10-CM

## 2025-10-08 ENCOUNTER — APPOINTMENT (OUTPATIENT)
Dept: PRIMARY CARE | Facility: CLINIC | Age: 89
End: 2025-10-08
Payer: MEDICARE

## 2025-10-15 ENCOUNTER — APPOINTMENT (OUTPATIENT)
Dept: PRIMARY CARE | Facility: CLINIC | Age: 89
End: 2025-10-15
Payer: MEDICARE